# Patient Record
Sex: MALE | Race: WHITE | NOT HISPANIC OR LATINO | Employment: FULL TIME | URBAN - METROPOLITAN AREA
[De-identification: names, ages, dates, MRNs, and addresses within clinical notes are randomized per-mention and may not be internally consistent; named-entity substitution may affect disease eponyms.]

---

## 2024-02-03 ENCOUNTER — APPOINTMENT (OUTPATIENT)
Dept: CARDIOLOGY | Facility: HOSPITAL | Age: 69
DRG: 322 | End: 2024-02-03
Payer: MEDICARE

## 2024-02-03 ENCOUNTER — APPOINTMENT (OUTPATIENT)
Dept: RADIOLOGY | Facility: HOSPITAL | Age: 69
DRG: 322 | End: 2024-02-03
Payer: MEDICARE

## 2024-02-03 ENCOUNTER — HOSPITAL ENCOUNTER (INPATIENT)
Facility: HOSPITAL | Age: 69
LOS: 1 days | Discharge: HOME | DRG: 322 | End: 2024-02-06
Attending: STUDENT IN AN ORGANIZED HEALTH CARE EDUCATION/TRAINING PROGRAM | Admitting: INTERNAL MEDICINE
Payer: MEDICARE

## 2024-02-03 DIAGNOSIS — R93.1 ABNORMAL FINDINGS ON DIAGNOSTIC IMAGING OF HEART AND CORONARY CIRCULATION: ICD-10-CM

## 2024-02-03 DIAGNOSIS — R06.02 SHORTNESS OF BREATH: ICD-10-CM

## 2024-02-03 DIAGNOSIS — R07.9 CHEST PAIN, UNSPECIFIED TYPE: Primary | ICD-10-CM

## 2024-02-03 DIAGNOSIS — R07.9 CHEST PAIN AT REST: ICD-10-CM

## 2024-02-03 DIAGNOSIS — I20.2 REFRACTORY ANGINA PECTORIS (CMS-HCC): ICD-10-CM

## 2024-02-03 DIAGNOSIS — I15.9 SECONDARY HYPERTENSION: ICD-10-CM

## 2024-02-03 DIAGNOSIS — R07.89 OTHER CHEST PAIN: ICD-10-CM

## 2024-02-03 LAB
ALBUMIN SERPL BCP-MCNC: 3.8 G/DL (ref 3.4–5)
ALP SERPL-CCNC: 55 U/L (ref 33–136)
ALT SERPL W P-5'-P-CCNC: 25 U/L (ref 10–52)
ANION GAP SERPL CALC-SCNC: 11 MMOL/L (ref 10–20)
AST SERPL W P-5'-P-CCNC: 14 U/L (ref 9–39)
BASOPHILS # BLD AUTO: 0.03 X10*3/UL (ref 0–0.1)
BASOPHILS NFR BLD AUTO: 0.4 %
BILIRUB SERPL-MCNC: 0.6 MG/DL (ref 0–1.2)
BNP SERPL-MCNC: 21 PG/ML (ref 0–99)
BUN SERPL-MCNC: 14 MG/DL (ref 6–23)
CALCIUM SERPL-MCNC: 8.4 MG/DL (ref 8.6–10.3)
CARDIAC TROPONIN I PNL SERPL HS: 5 NG/L (ref 0–20)
CARDIAC TROPONIN I PNL SERPL HS: 5 NG/L (ref 0–20)
CHLORIDE SERPL-SCNC: 104 MMOL/L (ref 98–107)
CO2 SERPL-SCNC: 23 MMOL/L (ref 21–32)
CREAT SERPL-MCNC: 0.97 MG/DL (ref 0.5–1.3)
EGFRCR SERPLBLD CKD-EPI 2021: 86 ML/MIN/1.73M*2
EOSINOPHIL # BLD AUTO: 0.17 X10*3/UL (ref 0–0.7)
EOSINOPHIL NFR BLD AUTO: 2.5 %
ERYTHROCYTE [DISTWIDTH] IN BLOOD BY AUTOMATED COUNT: 13.1 % (ref 11.5–14.5)
FLUAV RNA RESP QL NAA+PROBE: NOT DETECTED
FLUBV RNA RESP QL NAA+PROBE: NOT DETECTED
GLUCOSE SERPL-MCNC: 129 MG/DL (ref 74–99)
HCT VFR BLD AUTO: 45.6 % (ref 41–52)
HGB BLD-MCNC: 15.9 G/DL (ref 13.5–17.5)
IMM GRANULOCYTES # BLD AUTO: 0.01 X10*3/UL (ref 0–0.7)
IMM GRANULOCYTES NFR BLD AUTO: 0.1 % (ref 0–0.9)
LYMPHOCYTES # BLD AUTO: 0.9 X10*3/UL (ref 1.2–4.8)
LYMPHOCYTES NFR BLD AUTO: 13.1 %
MAGNESIUM SERPL-MCNC: 1.58 MG/DL (ref 1.6–2.4)
MCH RBC QN AUTO: 30.6 PG (ref 26–34)
MCHC RBC AUTO-ENTMCNC: 34.9 G/DL (ref 32–36)
MCV RBC AUTO: 88 FL (ref 80–100)
MONOCYTES # BLD AUTO: 0.64 X10*3/UL (ref 0.1–1)
MONOCYTES NFR BLD AUTO: 9.3 %
NEUTROPHILS # BLD AUTO: 5.14 X10*3/UL (ref 1.2–7.7)
NEUTROPHILS NFR BLD AUTO: 74.6 %
NRBC BLD-RTO: 0 /100 WBCS (ref 0–0)
PLATELET # BLD AUTO: 207 X10*3/UL (ref 150–450)
POTASSIUM SERPL-SCNC: 3.7 MMOL/L (ref 3.5–5.3)
PROT SERPL-MCNC: 6.7 G/DL (ref 6.4–8.2)
RBC # BLD AUTO: 5.19 X10*6/UL (ref 4.5–5.9)
SARS-COV-2 RNA RESP QL NAA+PROBE: NOT DETECTED
SODIUM SERPL-SCNC: 134 MMOL/L (ref 136–145)
WBC # BLD AUTO: 6.9 X10*3/UL (ref 4.4–11.3)

## 2024-02-03 PROCEDURE — G0378 HOSPITAL OBSERVATION PER HR: HCPCS

## 2024-02-03 PROCEDURE — 93005 ELECTROCARDIOGRAM TRACING: CPT

## 2024-02-03 PROCEDURE — 36415 COLL VENOUS BLD VENIPUNCTURE: CPT | Performed by: STUDENT IN AN ORGANIZED HEALTH CARE EDUCATION/TRAINING PROGRAM

## 2024-02-03 PROCEDURE — 83880 ASSAY OF NATRIURETIC PEPTIDE: CPT | Performed by: STUDENT IN AN ORGANIZED HEALTH CARE EDUCATION/TRAINING PROGRAM

## 2024-02-03 PROCEDURE — 2500000001 HC RX 250 WO HCPCS SELF ADMINISTERED DRUGS (ALT 637 FOR MEDICARE OP): Performed by: STUDENT IN AN ORGANIZED HEALTH CARE EDUCATION/TRAINING PROGRAM

## 2024-02-03 PROCEDURE — 99285 EMERGENCY DEPT VISIT HI MDM: CPT | Mod: 25 | Performed by: STUDENT IN AN ORGANIZED HEALTH CARE EDUCATION/TRAINING PROGRAM

## 2024-02-03 PROCEDURE — 2550000001 HC RX 255 CONTRASTS: Performed by: STUDENT IN AN ORGANIZED HEALTH CARE EDUCATION/TRAINING PROGRAM

## 2024-02-03 PROCEDURE — 96372 THER/PROPH/DIAG INJ SC/IM: CPT

## 2024-02-03 PROCEDURE — 83735 ASSAY OF MAGNESIUM: CPT | Performed by: STUDENT IN AN ORGANIZED HEALTH CARE EDUCATION/TRAINING PROGRAM

## 2024-02-03 PROCEDURE — 80053 COMPREHEN METABOLIC PANEL: CPT | Performed by: STUDENT IN AN ORGANIZED HEALTH CARE EDUCATION/TRAINING PROGRAM

## 2024-02-03 PROCEDURE — 87636 SARSCOV2 & INF A&B AMP PRB: CPT | Performed by: STUDENT IN AN ORGANIZED HEALTH CARE EDUCATION/TRAINING PROGRAM

## 2024-02-03 PROCEDURE — 84484 ASSAY OF TROPONIN QUANT: CPT | Performed by: STUDENT IN AN ORGANIZED HEALTH CARE EDUCATION/TRAINING PROGRAM

## 2024-02-03 PROCEDURE — 85025 COMPLETE CBC W/AUTO DIFF WBC: CPT | Performed by: STUDENT IN AN ORGANIZED HEALTH CARE EDUCATION/TRAINING PROGRAM

## 2024-02-03 PROCEDURE — 71045 X-RAY EXAM CHEST 1 VIEW: CPT | Performed by: RADIOLOGY

## 2024-02-03 PROCEDURE — 70450 CT HEAD/BRAIN W/O DYE: CPT | Performed by: RADIOLOGY

## 2024-02-03 PROCEDURE — 71045 X-RAY EXAM CHEST 1 VIEW: CPT

## 2024-02-03 PROCEDURE — 2500000004 HC RX 250 GENERAL PHARMACY W/ HCPCS (ALT 636 FOR OP/ED): Performed by: STUDENT IN AN ORGANIZED HEALTH CARE EDUCATION/TRAINING PROGRAM

## 2024-02-03 PROCEDURE — 71275 CT ANGIOGRAPHY CHEST: CPT

## 2024-02-03 PROCEDURE — 99223 1ST HOSP IP/OBS HIGH 75: CPT | Performed by: STUDENT IN AN ORGANIZED HEALTH CARE EDUCATION/TRAINING PROGRAM

## 2024-02-03 PROCEDURE — 96365 THER/PROPH/DIAG IV INF INIT: CPT

## 2024-02-03 PROCEDURE — 70450 CT HEAD/BRAIN W/O DYE: CPT

## 2024-02-03 RX ORDER — ENOXAPARIN SODIUM 100 MG/ML
40 INJECTION SUBCUTANEOUS EVERY 24 HOURS
Status: DISCONTINUED | OUTPATIENT
Start: 2024-02-03 | End: 2024-02-06 | Stop reason: HOSPADM

## 2024-02-03 RX ORDER — ASPIRIN 81 MG/1
81 TABLET ORAL DAILY
COMMUNITY

## 2024-02-03 RX ORDER — CARVEDILOL 3.12 MG/1
6.25 TABLET ORAL 2 TIMES DAILY
Status: DISCONTINUED | OUTPATIENT
Start: 2024-02-03 | End: 2024-02-05

## 2024-02-03 RX ORDER — MAGNESIUM SULFATE HEPTAHYDRATE 40 MG/ML
2 INJECTION, SOLUTION INTRAVENOUS ONCE
Status: COMPLETED | OUTPATIENT
Start: 2024-02-03 | End: 2024-02-03

## 2024-02-03 RX ORDER — HYDRALAZINE HYDROCHLORIDE 20 MG/ML
5 INJECTION INTRAMUSCULAR; INTRAVENOUS EVERY 6 HOURS PRN
Status: DISCONTINUED | OUTPATIENT
Start: 2024-02-03 | End: 2024-02-06 | Stop reason: HOSPADM

## 2024-02-03 RX ORDER — NITROGLYCERIN 0.4 MG/1
0.4 TABLET SUBLINGUAL EVERY 5 MIN PRN
Status: DISCONTINUED | OUTPATIENT
Start: 2024-02-03 | End: 2024-02-06 | Stop reason: HOSPADM

## 2024-02-03 RX ORDER — ACETAMINOPHEN 325 MG/1
650 TABLET ORAL EVERY 4 HOURS PRN
Status: DISCONTINUED | OUTPATIENT
Start: 2024-02-03 | End: 2024-02-06 | Stop reason: HOSPADM

## 2024-02-03 RX ORDER — AMLODIPINE BESYLATE 5 MG/1
5 TABLET ORAL DAILY
Status: DISCONTINUED | OUTPATIENT
Start: 2024-02-03 | End: 2024-02-06 | Stop reason: HOSPADM

## 2024-02-03 RX ORDER — METOCLOPRAMIDE HYDROCHLORIDE 5 MG/ML
10 INJECTION INTRAMUSCULAR; INTRAVENOUS ONCE
Status: DISCONTINUED | OUTPATIENT
Start: 2024-02-03 | End: 2024-02-03

## 2024-02-03 RX ORDER — NITROGLYCERIN 0.4 MG/1
0.4 TABLET SUBLINGUAL ONCE
Status: COMPLETED | OUTPATIENT
Start: 2024-02-03 | End: 2024-02-03

## 2024-02-03 RX ORDER — NAPROXEN SODIUM 220 MG/1
81 TABLET, FILM COATED ORAL DAILY
Status: DISCONTINUED | OUTPATIENT
Start: 2024-02-04 | End: 2024-02-06 | Stop reason: HOSPADM

## 2024-02-03 RX ORDER — ONDANSETRON HYDROCHLORIDE 2 MG/ML
4 INJECTION, SOLUTION INTRAVENOUS EVERY 6 HOURS PRN
Status: DISCONTINUED | OUTPATIENT
Start: 2024-02-03 | End: 2024-02-06 | Stop reason: HOSPADM

## 2024-02-03 RX ORDER — KETOROLAC TROMETHAMINE 30 MG/ML
15 INJECTION, SOLUTION INTRAMUSCULAR; INTRAVENOUS ONCE
Status: DISCONTINUED | OUTPATIENT
Start: 2024-02-03 | End: 2024-02-03

## 2024-02-03 RX ORDER — NAPROXEN SODIUM 220 MG/1
324 TABLET, FILM COATED ORAL ONCE
Status: COMPLETED | OUTPATIENT
Start: 2024-02-03 | End: 2024-02-03

## 2024-02-03 RX ADMIN — NITROGLYCERIN 0.4 MG: 0.4 TABLET SUBLINGUAL at 17:55

## 2024-02-03 RX ADMIN — IOHEXOL 135 ML: 350 INJECTION, SOLUTION INTRAVENOUS at 15:26

## 2024-02-03 RX ADMIN — CARVEDILOL 6.25 MG: 3.12 TABLET, FILM COATED ORAL at 22:02

## 2024-02-03 RX ADMIN — ENOXAPARIN SODIUM 40 MG: 40 INJECTION SUBCUTANEOUS at 19:42

## 2024-02-03 RX ADMIN — AMLODIPINE BESYLATE 5 MG: 5 TABLET ORAL at 22:02

## 2024-02-03 RX ADMIN — MAGNESIUM SULFATE HEPTAHYDRATE 2 G: 40 INJECTION, SOLUTION INTRAVENOUS at 19:36

## 2024-02-03 RX ADMIN — ASPIRIN 81 MG CHEWABLE TABLET 324 MG: 81 TABLET CHEWABLE at 17:54

## 2024-02-03 SDOH — SOCIAL STABILITY: SOCIAL INSECURITY: ARE THERE ANY APPARENT SIGNS OF INJURIES/BEHAVIORS THAT COULD BE RELATED TO ABUSE/NEGLECT?: NO

## 2024-02-03 SDOH — SOCIAL STABILITY: SOCIAL INSECURITY: DO YOU FEEL ANYONE HAS EXPLOITED OR TAKEN ADVANTAGE OF YOU FINANCIALLY OR OF YOUR PERSONAL PROPERTY?: NO

## 2024-02-03 SDOH — SOCIAL STABILITY: SOCIAL INSECURITY: WERE YOU ABLE TO COMPLETE ALL THE BEHAVIORAL HEALTH SCREENINGS?: YES

## 2024-02-03 SDOH — SOCIAL STABILITY: SOCIAL INSECURITY: HAVE YOU HAD THOUGHTS OF HARMING ANYONE ELSE?: NO

## 2024-02-03 SDOH — SOCIAL STABILITY: SOCIAL INSECURITY: ARE YOU OR HAVE YOU BEEN THREATENED OR ABUSED PHYSICALLY, EMOTIONALLY, OR SEXUALLY BY ANYONE?: NO

## 2024-02-03 SDOH — SOCIAL STABILITY: SOCIAL INSECURITY: DOES ANYONE TRY TO KEEP YOU FROM HAVING/CONTACTING OTHER FRIENDS OR DOING THINGS OUTSIDE YOUR HOME?: NO

## 2024-02-03 SDOH — SOCIAL STABILITY: SOCIAL INSECURITY: DO YOU FEEL UNSAFE GOING BACK TO THE PLACE WHERE YOU ARE LIVING?: NO

## 2024-02-03 SDOH — SOCIAL STABILITY: SOCIAL INSECURITY: ABUSE: ADULT

## 2024-02-03 SDOH — SOCIAL STABILITY: SOCIAL INSECURITY: HAS ANYONE EVER THREATENED TO HURT YOUR FAMILY OR YOUR PETS?: NO

## 2024-02-03 ASSESSMENT — LIFESTYLE VARIABLES
AUDIT-C TOTAL SCORE: 0
HOW OFTEN DO YOU HAVE A DRINK CONTAINING ALCOHOL: NEVER
HOW MANY STANDARD DRINKS CONTAINING ALCOHOL DO YOU HAVE ON A TYPICAL DAY: PATIENT DOES NOT DRINK
EVER HAD A DRINK FIRST THING IN THE MORNING TO STEADY YOUR NERVES TO GET RID OF A HANGOVER: NO
HAVE YOU EVER FELT YOU SHOULD CUT DOWN ON YOUR DRINKING: NO
EVER FELT BAD OR GUILTY ABOUT YOUR DRINKING: NO
AUDIT-C TOTAL SCORE: 0
HAVE PEOPLE ANNOYED YOU BY CRITICIZING YOUR DRINKING: NO
HOW OFTEN DO YOU HAVE 6 OR MORE DRINKS ON ONE OCCASION: NEVER
SKIP TO QUESTIONS 9-10: 1

## 2024-02-03 ASSESSMENT — COGNITIVE AND FUNCTIONAL STATUS - GENERAL
PATIENT BASELINE BEDBOUND: NO
DAILY ACTIVITIY SCORE: 24
MOBILITY SCORE: 24

## 2024-02-03 ASSESSMENT — PATIENT HEALTH QUESTIONNAIRE - PHQ9
1. LITTLE INTEREST OR PLEASURE IN DOING THINGS: NOT AT ALL
2. FEELING DOWN, DEPRESSED OR HOPELESS: NOT AT ALL
SUM OF ALL RESPONSES TO PHQ9 QUESTIONS 1 & 2: 0

## 2024-02-03 ASSESSMENT — ACTIVITIES OF DAILY LIVING (ADL)
GROOMING: INDEPENDENT
ADEQUATE_TO_COMPLETE_ADL: YES
TOILETING: INDEPENDENT
DRESSING YOURSELF: INDEPENDENT
BATHING: INDEPENDENT
WALKS IN HOME: INDEPENDENT
PATIENT'S MEMORY ADEQUATE TO SAFELY COMPLETE DAILY ACTIVITIES?: YES
LACK_OF_TRANSPORTATION: NO
HEARING - LEFT EAR: FUNCTIONAL
JUDGMENT_ADEQUATE_SAFELY_COMPLETE_DAILY_ACTIVITIES: YES
FEEDING YOURSELF: INDEPENDENT
HEARING - RIGHT EAR: FUNCTIONAL

## 2024-02-03 ASSESSMENT — COLUMBIA-SUICIDE SEVERITY RATING SCALE - C-SSRS
6. HAVE YOU EVER DONE ANYTHING, STARTED TO DO ANYTHING, OR PREPARED TO DO ANYTHING TO END YOUR LIFE?: NO
1. IN THE PAST MONTH, HAVE YOU WISHED YOU WERE DEAD OR WISHED YOU COULD GO TO SLEEP AND NOT WAKE UP?: NO
2. HAVE YOU ACTUALLY HAD ANY THOUGHTS OF KILLING YOURSELF?: NO

## 2024-02-03 ASSESSMENT — PAIN SCALES - GENERAL
PAINLEVEL_OUTOF10: 5 - MODERATE PAIN
PAINLEVEL_OUTOF10: 7

## 2024-02-03 ASSESSMENT — PAIN - FUNCTIONAL ASSESSMENT
PAIN_FUNCTIONAL_ASSESSMENT: 0-10
PAIN_FUNCTIONAL_ASSESSMENT: 0-10

## 2024-02-03 NOTE — Clinical Note
Angioplasty of the left anterior descending lesion. Inflation 1: Pressure = 22 jeny; Duration = 20 sec. Inflation 2: Pressure = 16 jeny; Duration = 15 sec. Inflation 3: Pressure = 18 jeny; Duration = 15 sec.

## 2024-02-03 NOTE — Clinical Note
Angioplasty of the left anterior descending lesion. Inflation 1: Pressure = 6 jeny; Duration = 30 sec. Inflation 2: Pressure = 12 jeny; Duration = 15 sec. Inflation 3: Pressure = 6 jeny; Duration = 30 sec.

## 2024-02-04 LAB
ANION GAP SERPL CALC-SCNC: 12 MMOL/L (ref 10–20)
BUN SERPL-MCNC: 15 MG/DL (ref 6–23)
CALCIUM SERPL-MCNC: 8.8 MG/DL (ref 8.6–10.3)
CARDIAC TROPONIN I PNL SERPL HS: 4 NG/L (ref 0–20)
CHLORIDE SERPL-SCNC: 106 MMOL/L (ref 98–107)
CHOLEST SERPL-MCNC: 189 MG/DL (ref 0–199)
CHOLESTEROL/HDL RATIO: 4.4
CO2 SERPL-SCNC: 22 MMOL/L (ref 21–32)
CREAT SERPL-MCNC: 0.92 MG/DL (ref 0.5–1.3)
EGFRCR SERPLBLD CKD-EPI 2021: >90 ML/MIN/1.73M*2
ERYTHROCYTE [DISTWIDTH] IN BLOOD BY AUTOMATED COUNT: 13.2 % (ref 11.5–14.5)
EST. AVERAGE GLUCOSE BLD GHB EST-MCNC: 126 MG/DL
GLUCOSE SERPL-MCNC: 109 MG/DL (ref 74–99)
HBA1C MFR BLD: 6 %
HCT VFR BLD AUTO: 47.1 % (ref 41–52)
HDLC SERPL-MCNC: 42.7 MG/DL
HGB BLD-MCNC: 15.8 G/DL (ref 13.5–17.5)
LDLC SERPL CALC-MCNC: 127 MG/DL
MAGNESIUM SERPL-MCNC: 2.1 MG/DL (ref 1.6–2.4)
MCH RBC QN AUTO: 29.8 PG (ref 26–34)
MCHC RBC AUTO-ENTMCNC: 33.5 G/DL (ref 32–36)
MCV RBC AUTO: 89 FL (ref 80–100)
NON HDL CHOLESTEROL: 146 MG/DL (ref 0–149)
NRBC BLD-RTO: 0 /100 WBCS (ref 0–0)
PLATELET # BLD AUTO: 230 X10*3/UL (ref 150–450)
POTASSIUM SERPL-SCNC: 4.2 MMOL/L (ref 3.5–5.3)
RBC # BLD AUTO: 5.3 X10*6/UL (ref 4.5–5.9)
SODIUM SERPL-SCNC: 136 MMOL/L (ref 136–145)
TRIGL SERPL-MCNC: 96 MG/DL (ref 0–149)
VLDL: 19 MG/DL (ref 0–40)
WBC # BLD AUTO: 4.6 X10*3/UL (ref 4.4–11.3)

## 2024-02-04 PROCEDURE — 2500000001 HC RX 250 WO HCPCS SELF ADMINISTERED DRUGS (ALT 637 FOR MEDICARE OP): Performed by: STUDENT IN AN ORGANIZED HEALTH CARE EDUCATION/TRAINING PROGRAM

## 2024-02-04 PROCEDURE — G0378 HOSPITAL OBSERVATION PER HR: HCPCS

## 2024-02-04 PROCEDURE — 85027 COMPLETE CBC AUTOMATED: CPT | Performed by: STUDENT IN AN ORGANIZED HEALTH CARE EDUCATION/TRAINING PROGRAM

## 2024-02-04 PROCEDURE — 99223 1ST HOSP IP/OBS HIGH 75: CPT | Performed by: STUDENT IN AN ORGANIZED HEALTH CARE EDUCATION/TRAINING PROGRAM

## 2024-02-04 PROCEDURE — 80061 LIPID PANEL: CPT | Performed by: STUDENT IN AN ORGANIZED HEALTH CARE EDUCATION/TRAINING PROGRAM

## 2024-02-04 PROCEDURE — 36415 COLL VENOUS BLD VENIPUNCTURE: CPT | Performed by: STUDENT IN AN ORGANIZED HEALTH CARE EDUCATION/TRAINING PROGRAM

## 2024-02-04 PROCEDURE — 80048 BASIC METABOLIC PNL TOTAL CA: CPT | Performed by: STUDENT IN AN ORGANIZED HEALTH CARE EDUCATION/TRAINING PROGRAM

## 2024-02-04 PROCEDURE — 2500000005 HC RX 250 GENERAL PHARMACY W/O HCPCS: Performed by: STUDENT IN AN ORGANIZED HEALTH CARE EDUCATION/TRAINING PROGRAM

## 2024-02-04 PROCEDURE — 84484 ASSAY OF TROPONIN QUANT: CPT | Performed by: STUDENT IN AN ORGANIZED HEALTH CARE EDUCATION/TRAINING PROGRAM

## 2024-02-04 PROCEDURE — 2500000004 HC RX 250 GENERAL PHARMACY W/ HCPCS (ALT 636 FOR OP/ED): Performed by: STUDENT IN AN ORGANIZED HEALTH CARE EDUCATION/TRAINING PROGRAM

## 2024-02-04 PROCEDURE — 83735 ASSAY OF MAGNESIUM: CPT | Performed by: STUDENT IN AN ORGANIZED HEALTH CARE EDUCATION/TRAINING PROGRAM

## 2024-02-04 PROCEDURE — 83036 HEMOGLOBIN GLYCOSYLATED A1C: CPT | Mod: PARLAB | Performed by: STUDENT IN AN ORGANIZED HEALTH CARE EDUCATION/TRAINING PROGRAM

## 2024-02-04 PROCEDURE — 99232 SBSQ HOSP IP/OBS MODERATE 35: CPT | Performed by: INTERNAL MEDICINE

## 2024-02-04 RX ORDER — ATORVASTATIN CALCIUM 80 MG/1
80 TABLET, FILM COATED ORAL NIGHTLY
Status: DISCONTINUED | OUTPATIENT
Start: 2024-02-04 | End: 2024-02-06 | Stop reason: HOSPADM

## 2024-02-04 RX ADMIN — ATORVASTATIN CALCIUM 80 MG: 80 TABLET, FILM COATED ORAL at 20:41

## 2024-02-04 RX ADMIN — ENOXAPARIN SODIUM 40 MG: 40 INJECTION SUBCUTANEOUS at 20:41

## 2024-02-04 RX ADMIN — NITROGLYCERIN 0.4 MG: 0.4 TABLET SUBLINGUAL at 20:51

## 2024-02-04 RX ADMIN — CARVEDILOL 6.25 MG: 3.12 TABLET, FILM COATED ORAL at 10:02

## 2024-02-04 RX ADMIN — Medication 2 L/MIN: at 21:48

## 2024-02-04 RX ADMIN — ASPIRIN 81 MG 81 MG: 81 TABLET ORAL at 10:02

## 2024-02-04 RX ADMIN — AMLODIPINE BESYLATE 5 MG: 5 TABLET ORAL at 10:02

## 2024-02-04 RX ADMIN — CARVEDILOL 6.25 MG: 3.12 TABLET, FILM COATED ORAL at 20:41

## 2024-02-04 RX ADMIN — ACETAMINOPHEN 650 MG: 325 TABLET ORAL at 01:59

## 2024-02-04 RX ADMIN — HYDRALAZINE HYDROCHLORIDE 5 MG: 20 INJECTION INTRAMUSCULAR; INTRAVENOUS at 16:30

## 2024-02-04 ASSESSMENT — COGNITIVE AND FUNCTIONAL STATUS - GENERAL
DAILY ACTIVITIY SCORE: 24
MOBILITY SCORE: 24
MOBILITY SCORE: 24
DAILY ACTIVITIY SCORE: 24

## 2024-02-04 ASSESSMENT — PAIN DESCRIPTION - DESCRIPTORS
DESCRIPTORS: SQUEEZING
DESCRIPTORS: SQUEEZING

## 2024-02-04 ASSESSMENT — PAIN SCALES - GENERAL
PAINLEVEL_OUTOF10: 4
PAINLEVEL_OUTOF10: 3
PAINLEVEL_OUTOF10: 0 - NO PAIN
PAINLEVEL_OUTOF10: 4
PAINLEVEL_OUTOF10: 3

## 2024-02-04 ASSESSMENT — PAIN - FUNCTIONAL ASSESSMENT
PAIN_FUNCTIONAL_ASSESSMENT: 0-10

## 2024-02-04 NOTE — H&P
History Of Present Illness  67 yo M with PMHx of HTN (not currently on medications) presents with non exertional, intermittent, 6/10, L sided, squeezing chest pressure with radiation to his L shoulder and arm accompanied with mild sob over the past 3 days. He denies diaphoresis, palpitations, orthopnea, and LE swelling. Family gave him one SL nitrogylcerin on the way to the hospital which did alleviate some of his discomfort. A second dose was given in the ED with significant reduction in his CP. He was also noted to hypertensive on arrival to the ED with SBP in the 170s. At time of exam (after 2 nitroglycerin) .     Surgical History  denies     Social History  Denies tobacco, EtOH, and illicit    Family History  HTN    Allergies  Captopril    Review of Systems   Cardiovascular:  Positive for chest pain.   All other systems reviewed and are negative.    PE:  G: aox3, NAD, cooperative  HENT: neck supple, no JVD  Eyes: clear sclera  CV: RRR s1 s2  L: clear  Abd: soft, NT, non distended  Ext: no c/c/e  N: no appreciable acute focal deficits  Psych: appropriate mood and behavior     Last Recorded Vitals  /89 (BP Location: Left arm, Patient Position: Sitting)   Pulse 83   Temp 36.4 °C (97.5 °F) (Temporal)   Resp (!) 25   Wt 93 kg (205 lb)   SpO2 95%     Assessment/Plan     Atypical CP  HTN, uncontrolled  LVH  DVT ppx  Full code    Plan:  - Cycle troponin/EKG, ASA 81mg, PRN nitroSL, ECHO, cardiology consultation, NPO after midnight  - Likely longstanding uncontrolled HTN with LVH, has angioedema to ace-I, will also avoid ARB, will go ahead and start coreg and norvasc, BP lowering effect of norvasc may not be seen for a least 24 hours. Monitor BP trend and adjust BP meds accordingly.     Giacomo Smith, DO

## 2024-02-04 NOTE — CARE PLAN
The patient's goals for the shift include      The clinical goals for the shift include rest/comfort    Problem: Pain - Adult  Goal: Verbalizes/displays adequate comfort level or baseline comfort level  Outcome: Progressing     Problem: Safety - Adult  Goal: Free from fall injury  Outcome: Progressing     Problem: Discharge Planning  Goal: Discharge to home or other facility with appropriate resources  Outcome: Progressing     Problem: Chronic Conditions and Co-morbidities  Goal: Patient's chronic conditions and co-morbidity symptoms are monitored and maintained or improved  Outcome: Progressing     Problem: Pain  Goal: Takes deep breaths with improved pain control throughout the shift  Outcome: Progressing  Goal: Turns in bed with improved pain control throughout the shift  Outcome: Progressing  Goal: Walks with improved pain control throughout the shift  Outcome: Progressing  Goal: Performs ADL's with improved pain control throughout shift  Outcome: Progressing  Goal: Participates in PT with improved pain control throughout the shift  Outcome: Progressing  Goal: Free from opioid side effects throughout the shift  Outcome: Progressing  Goal: Free from acute confusion related to pain meds throughout the shift  Outcome: Progressing

## 2024-02-04 NOTE — CARE PLAN
The patient's goals for the shift include      The clinical goals for the shift include pt will report reduced / no pain    Over the shift, the patient did not make progress toward the following goals. Barriers to progression include reporting pain early. Recommendations to address these barriers include assess pain frequently.

## 2024-02-04 NOTE — PROGRESS NOTES
Pharmacy Medication History Review    Satya Camarillo is a 68 y.o. male admitted for No Principal Problem: There is no principal problem currently on the Problem List. Please update the Problem List and refresh.. Pharmacy reviewed the patient's [id number] medications and allergies for accuracy.    The list below reflectives the updated PTA list. Please review each medication in order reconciliation for additional clarification and justification.  Prior to Admission medications    Medication Sig Start Date End Date Taking? Authorizing Provider   aspirin 81 mg EC tablet Take 1 tablet (81 mg) by mouth once daily.   Yes Historical Provider, MD        The list below reflectives the updated allergy list. Please review each documented allergy for additional clarification and justification.  Allergies  Reviewed by Mary Steele CPhT on 2/3/2024        Severity Reactions Comments    Captopril Medium Angioedema             Below are additional concerns with the patient's PTA list.      Mary Steele CPhT

## 2024-02-04 NOTE — CONSULTS
Inpatient consult to Cardiology  Consult performed by: Augustine Valentine MD PhD  Consult ordered by: Giacomo Smith DO  Reason for consult: chest pain        History Of Present Illness:    67 yo M with PMHx of HTN (not currently on medications) presents with non exertional, intermittent, 6/10, L sided, squeezing chest pressure with radiation to his L shoulder and arm accompanied with mild sob over the past 3 days. He denies diaphoresis, palpitations, orthopnea, and LE swelling. Family gave him one SL nitrogylcerin on the way to the hospital which did alleviate some of his discomfort. A second dose was given in the ED with significant reduction in his CP. He was also noted to hypertensive on arrival to the ED with SBP in the 170s. At time of exam (after 2 nitroglycerin) .      Surgical History  denies     Social History  Denies tobacco, EtOH, and illicit     Family History  HTN     Allergies  Captopril     Review of Systems   Cardiovascular:  Positive for chest pain.   All other systems reviewed and are negative.     PE:  G: aox3, NAD, cooperative  HENT: neck supple, no JVD  Eyes: clear sclera  CV: RRR s1 s2  L: clear  Abd: soft, NT, non distended  Ext: no c/c/e  N: no appreciable acute focal deficits  Psych: appropriate mood and behavior         Last Recorded Vitals:  Vitals:    02/03/24 2005 02/04/24 0149 02/04/24 0624 02/04/24 1012   BP: (!) 166/98 145/84 161/89 (!) 156/92   BP Location: Right arm Left arm Left arm    Patient Position: Lying Lying Lying    Pulse: 72 68 66 60   Resp: 18 16 18    Temp: 36.7 °C (98.1 °F) 36.7 °C (98.1 °F) 36.3 °C (97.3 °F) 36 °C (96.8 °F)   TempSrc: Temporal Temporal Temporal    SpO2: 92% 91% 91% 94%   Weight:       Height:           Last Labs:  CBC - 2/4/2024:  8:07 AM  4.6 15.8 230    47.1      CMP - 2/4/2024:  8:07 AM  8.8 6.7 14 --- 0.6   _ 3.8 25 55      PTT - No results in last year.  _   _ _     Troponin I, High Sensitivity   Date/Time Value Ref Range Status  "  02/04/2024 08:07 AM 4 0 - 20 ng/L Final   02/03/2024 06:11 PM 5 0 - 20 ng/L Final   02/03/2024 03:31 PM 5 0 - 20 ng/L Final     BNP   Date/Time Value Ref Range Status   02/03/2024 03:31 PM 21 0 - 99 pg/mL Final     LDL Calculated   Date/Time Value Ref Range Status   02/04/2024 08:07  (H) <=99 mg/dL Final     Comment:                                 Near   Borderline      AGE      Desirable  Optimal    High     High     Very High     0-19 Y     0 - 109     ---    110-129   >/= 130     ----    20-24 Y     0 - 119     ---    120-159   >/= 160     ----      >24 Y     0 -  99   100-129  130-159   160-189     >/=190       VLDL   Date/Time Value Ref Range Status   02/04/2024 08:07 AM 19 0 - 40 mg/dL Final      Last I/O:  I/O last 3 completed shifts:  In: 170.8 (1.8 mL/kg) [P.O.:120; I.V.:50.8 (0.5 mL/kg)]  Out: - (0 mL/kg)   Weight: 93 kg     Past Cardiology Tests (Last 3 Years):  EKG:  No results found for this or any previous visit from the past 1095 days.    Echo:  No results found for this or any previous visit from the past 1095 days.    Ejection Fractions:  No results found for: \"EF\"  Cath:  No results found for this or any previous visit from the past 1095 days.    Stress Test:  No results found for this or any previous visit from the past 1095 days.    Cardiac Imaging:  No results found for this or any previous visit from the past 1095 days.      Past Medical History:  He has no past medical history on file.    Past Surgical History:  He has no past surgical history on file.      Social History:  He reports that he has never smoked. He has never used smokeless tobacco. He reports that he does not currently use alcohol. He reports that he does not use drugs.    Family History:  No family history on file.     Allergies:  Captopril    Inpatient Medications:  Scheduled medications   Medication Dose Route Frequency    amLODIPine  5 mg oral Daily    aspirin  81 mg oral Daily    atorvastatin  80 mg oral Nightly    " carvedilol  6.25 mg oral BID    enoxaparin  40 mg subcutaneous q24h    perflutren lipid microspheres  0.5-10 mL of dilution intravenous Once in imaging     PRN medications   Medication    acetaminophen    hydrALAZINE    nitroglycerin    ondansetron    oxygen     Continuous Medications   Medication Dose Last Rate     Outpatient Medications:  Current Outpatient Medications   Medication Instructions    aspirin 81 mg, oral, Daily       Physical Exam:  General: Awake, alert/oriented x3, well developed, no acute distress  Head: Atraumatic/Normocephalic  Eyes: Normal external exam, EOMI, PERRLA  ENT: Oropharynx normal, moist mucous membranes  Cardiovascular: RRR, S1/S2, no murmurs, rubs, or gallops, radial pulses +2, no edema of extremities  Pulmonary: CTAB, no respiratory distress. No wheezes, rales, or ronchi  Abdomen: +BS, soft, non-tender, nondistended, no guarding or rebound  MSK: No joint swelling, normal movements of all extremities. Range of motion- normal.  Extremities: no edema, no cyanosis  Neuro: Alert/oriented x3, no focal motor or sensory deficits  Psychiatric: Judgment intact. Appropriate mood and behavior     Assessment/Plan   Chest pain  Hypertension   Hyperlipidemia  Abnormal EKG      Had a long conversation with the patient and his wife at bedside.  He reports that he has been having episodes of chest pain on and off for the last 2 years that usually improves after rest.  His father had a stroke in his 70s and .  No other family history of premature coronary artery disease as far as he knows.  His EKG is abnormal with poor R progression and evidence for LVH.  Troponin repeats have been negative.  He has untreated hypercholesterolemia.  He also was found to be hypertensive on no medication.  Overall his chest pain is concerning to me and while it can be just due to high blood pressure, I believe it is reasonable to proceed with further evaluation with echocardiogram as well as a stress testing to rule  out any significant cardiac dysfunction and evidence of ischemia.  This test will be done tomorrow and based on that decision will be made if proceeding with heart catheterization will be needed.  For now I suggest to continue with aspirin, blood pressure control on current medications, I will add Lipitor and close monitoring and telemetry.  Patient and wife are in agreement with the plan.      I appreciate the opportunity to participate in this patient's care.      Augustine Valentine MD, PhD, [facility], [facility]  Interventional Cardiology, [facility]  Associate Professor of Medicine, [facility]  [email or web address]   Office:       Macro dragon disclaimer: This note was dictated by speech recognition. Minor errors in transcription may be present.

## 2024-02-04 NOTE — HOSPITAL COURSE
67 yo M with PMHx of HTN (not currently on medications) presents with non exertional, intermittent, 6/10, L sided, squeezing chest pressure with radiation to his L shoulder and arm accompanied with mild sob over the past 3 days. He denies diaphoresis, palpitations, orthopnea, and LE swelling. Family gave him one SL nitrogylcerin on the way to the hospital which did alleviate some of his discomfort. A second dose was given in the ED with significant reduction in his CP. He was also noted to hypertensive on arrival to the ED with SBP in the 170s. At time of exam (after 2 nitroglycerin) .

## 2024-02-04 NOTE — PROGRESS NOTES
Subjective:  Patient endorses is that he has been having chest pain for the past 2 years, however it became worse over the last few days.  States it is currently a 5/10 in severity.    Objective:  Visit Vitals  BP (!) 156/92   Pulse 60   Temp 36 °C (96.8 °F)   Resp 18      Physical Exam:   General: Alert, awake, cooperative.  HEENT:  Normocephalic, atraumatic, mucus membranes moist.  Neck:  Trachea midline.  Neck supple  Chest:  Clear to auscultation bilaterally. No wheezes, rales, or rhonchi.  CV:  Regular rate and rhythm.  Positive S1/S2.   GI: Bowel sounds present in all four quadrants, abdomen is soft, non-tender, non-distended.  Extremities:  No lower extremity edema or cyanosis.  Neurological:  AAOx3.  Moving all extremities  Skin:  Warm and dry.      Lab Results   Component Value Date    WBC 4.6 02/04/2024    HGB 15.8 02/04/2024    HCT 47.1 02/04/2024    MCV 89 02/04/2024     02/04/2024      Lab Results   Component Value Date    GLUCOSE 109 (H) 02/04/2024    CALCIUM 8.8 02/04/2024     02/04/2024    K 4.2 02/04/2024    CO2 22 02/04/2024     02/04/2024    BUN 15 02/04/2024    CREATININE 0.92 02/04/2024      Medications:  amLODIPine, 5 mg, oral, Daily  aspirin, 81 mg, oral, Daily  atorvastatin, 80 mg, oral, Nightly  carvedilol, 6.25 mg, oral, BID  enoxaparin, 40 mg, subcutaneous, q24h  perflutren lipid microspheres, 0.5-10 mL of dilution, intravenous, Once in imaging       Assessment/ Plan:  67 yo M with PMHx of HTN (not currently on medications) presents with non exertional, intermittent, 6/10, L sided, squeezing chest pressure with radiation to his L shoulder and arm accompanied with mild sob.    #Atypical chest pain  -Negative troponins.  Findings suggestive of LVH on EKG.  Difficult to assess coronary calcifications on CT due to significant motion artifact.  -Lipid panel reviewed.  Check A1c.  -Continue aspirin.  Atorvastatin added.  -Echo pending  -Cardiology following, awaiting echo and  recommending stress testing.     #Hypertension  -Continue Coreg and amlodipine.    #Hyperlipidemia  -High Intensity statin as above    Diet: Cardiac  DVT ppx: Lovenox  IVF: -  Consults: Cardiology  Dispo: Telemetry    Darrell Leger D.O.  Internal Medicine PGY-3    This is a preliminary note with physician attestation to follow.

## 2024-02-05 ENCOUNTER — APPOINTMENT (OUTPATIENT)
Dept: CARDIOLOGY | Facility: HOSPITAL | Age: 69
DRG: 322 | End: 2024-02-05
Payer: MEDICARE

## 2024-02-05 ENCOUNTER — APPOINTMENT (OUTPATIENT)
Dept: RADIOLOGY | Facility: HOSPITAL | Age: 69
DRG: 322 | End: 2024-02-05
Payer: MEDICARE

## 2024-02-05 PROBLEM — R07.9 CHEST PAIN AT REST: Status: ACTIVE | Noted: 2024-02-03

## 2024-02-05 PROBLEM — R07.9 CHEST PAIN, UNSPECIFIED TYPE: Status: ACTIVE | Noted: 2024-02-05

## 2024-02-05 LAB
ANION GAP SERPL CALC-SCNC: 12 MMOL/L (ref 10–20)
BUN SERPL-MCNC: 19 MG/DL (ref 6–23)
CALCIUM SERPL-MCNC: 9 MG/DL (ref 8.6–10.3)
CHLORIDE SERPL-SCNC: 107 MMOL/L (ref 98–107)
CO2 SERPL-SCNC: 23 MMOL/L (ref 21–32)
CREAT SERPL-MCNC: 1.06 MG/DL (ref 0.5–1.3)
EGFRCR SERPLBLD CKD-EPI 2021: 77 ML/MIN/1.73M*2
ERYTHROCYTE [DISTWIDTH] IN BLOOD BY AUTOMATED COUNT: 13.2 % (ref 11.5–14.5)
GLUCOSE SERPL-MCNC: 108 MG/DL (ref 74–99)
HCT VFR BLD AUTO: 50 % (ref 41–52)
HGB BLD-MCNC: 16.5 G/DL (ref 13.5–17.5)
MAGNESIUM SERPL-MCNC: 2.02 MG/DL (ref 1.6–2.4)
MCH RBC QN AUTO: 29.9 PG (ref 26–34)
MCHC RBC AUTO-ENTMCNC: 33 G/DL (ref 32–36)
MCV RBC AUTO: 91 FL (ref 80–100)
NRBC BLD-RTO: 0 /100 WBCS (ref 0–0)
PLATELET # BLD AUTO: 236 X10*3/UL (ref 150–450)
POTASSIUM SERPL-SCNC: 4.6 MMOL/L (ref 3.5–5.3)
RBC # BLD AUTO: 5.51 X10*6/UL (ref 4.5–5.9)
SODIUM SERPL-SCNC: 137 MMOL/L (ref 136–145)
WBC # BLD AUTO: 5.1 X10*3/UL (ref 4.4–11.3)

## 2024-02-05 PROCEDURE — 80048 BASIC METABOLIC PNL TOTAL CA: CPT | Performed by: STUDENT IN AN ORGANIZED HEALTH CARE EDUCATION/TRAINING PROGRAM

## 2024-02-05 PROCEDURE — 78452 HT MUSCLE IMAGE SPECT MULT: CPT

## 2024-02-05 PROCEDURE — 99221 1ST HOSP IP/OBS SF/LOW 40: CPT | Performed by: NURSE PRACTITIONER

## 2024-02-05 PROCEDURE — 2550000001 HC RX 255 CONTRASTS: Performed by: INTERNAL MEDICINE

## 2024-02-05 PROCEDURE — C1887 CATHETER, GUIDING: HCPCS | Performed by: INTERNAL MEDICINE

## 2024-02-05 PROCEDURE — 83735 ASSAY OF MAGNESIUM: CPT | Performed by: STUDENT IN AN ORGANIZED HEALTH CARE EDUCATION/TRAINING PROGRAM

## 2024-02-05 PROCEDURE — 93017 CV STRESS TEST TRACING ONLY: CPT

## 2024-02-05 PROCEDURE — 2500000004 HC RX 250 GENERAL PHARMACY W/ HCPCS (ALT 636 FOR OP/ED): Performed by: STUDENT IN AN ORGANIZED HEALTH CARE EDUCATION/TRAINING PROGRAM

## 2024-02-05 PROCEDURE — A9502 TC99M TETROFOSMIN: HCPCS | Performed by: STUDENT IN AN ORGANIZED HEALTH CARE EDUCATION/TRAINING PROGRAM

## 2024-02-05 PROCEDURE — 2500000001 HC RX 250 WO HCPCS SELF ADMINISTERED DRUGS (ALT 637 FOR MEDICARE OP): Performed by: NURSE PRACTITIONER

## 2024-02-05 PROCEDURE — 2060000001 HC INTERMEDIATE ICU ROOM DAILY

## 2024-02-05 PROCEDURE — C1874 STENT, COATED/COV W/DEL SYS: HCPCS | Performed by: INTERNAL MEDICINE

## 2024-02-05 PROCEDURE — 99152 MOD SED SAME PHYS/QHP 5/>YRS: CPT | Performed by: INTERNAL MEDICINE

## 2024-02-05 PROCEDURE — 2500000004 HC RX 250 GENERAL PHARMACY W/ HCPCS (ALT 636 FOR OP/ED): Performed by: NURSE PRACTITIONER

## 2024-02-05 PROCEDURE — 2720000007 HC OR 272 NO HCPCS: Performed by: INTERNAL MEDICINE

## 2024-02-05 PROCEDURE — 78452 HT MUSCLE IMAGE SPECT MULT: CPT | Performed by: INTERNAL MEDICINE

## 2024-02-05 PROCEDURE — C1894 INTRO/SHEATH, NON-LASER: HCPCS | Performed by: INTERNAL MEDICINE

## 2024-02-05 PROCEDURE — 93010 ELECTROCARDIOGRAM REPORT: CPT | Performed by: INTERNAL MEDICINE

## 2024-02-05 PROCEDURE — 2500000001 HC RX 250 WO HCPCS SELF ADMINISTERED DRUGS (ALT 637 FOR MEDICARE OP): Performed by: STUDENT IN AN ORGANIZED HEALTH CARE EDUCATION/TRAINING PROGRAM

## 2024-02-05 PROCEDURE — 99153 MOD SED SAME PHYS/QHP EA: CPT | Performed by: INTERNAL MEDICINE

## 2024-02-05 PROCEDURE — 93005 ELECTROCARDIOGRAM TRACING: CPT

## 2024-02-05 PROCEDURE — 99233 SBSQ HOSP IP/OBS HIGH 50: CPT | Performed by: INTERNAL MEDICINE

## 2024-02-05 PROCEDURE — 85027 COMPLETE CBC AUTOMATED: CPT | Performed by: STUDENT IN AN ORGANIZED HEALTH CARE EDUCATION/TRAINING PROGRAM

## 2024-02-05 PROCEDURE — 2500000001 HC RX 250 WO HCPCS SELF ADMINISTERED DRUGS (ALT 637 FOR MEDICARE OP): Performed by: INTERNAL MEDICINE

## 2024-02-05 PROCEDURE — C1725 CATH, TRANSLUMIN NON-LASER: HCPCS | Performed by: INTERNAL MEDICINE

## 2024-02-05 PROCEDURE — 7100000009 HC PHASE TWO TIME - INITIAL BASE CHARGE: Performed by: INTERNAL MEDICINE

## 2024-02-05 PROCEDURE — 92928 PRQ TCAT PLMT NTRAC ST 1 LES: CPT | Performed by: INTERNAL MEDICINE

## 2024-02-05 PROCEDURE — 4A023N7 MEASUREMENT OF CARDIAC SAMPLING AND PRESSURE, LEFT HEART, PERCUTANEOUS APPROACH: ICD-10-PCS | Performed by: INTERNAL MEDICINE

## 2024-02-05 PROCEDURE — 36415 COLL VENOUS BLD VENIPUNCTURE: CPT | Performed by: STUDENT IN AN ORGANIZED HEALTH CARE EDUCATION/TRAINING PROGRAM

## 2024-02-05 PROCEDURE — 2780000003 HC OR 278 NO HCPCS: Performed by: INTERNAL MEDICINE

## 2024-02-05 PROCEDURE — 027034Z DILATION OF CORONARY ARTERY, ONE ARTERY WITH DRUG-ELUTING INTRALUMINAL DEVICE, PERCUTANEOUS APPROACH: ICD-10-PCS | Performed by: INTERNAL MEDICINE

## 2024-02-05 PROCEDURE — 2500000005 HC RX 250 GENERAL PHARMACY W/O HCPCS: Performed by: INTERNAL MEDICINE

## 2024-02-05 PROCEDURE — 93458 L HRT ARTERY/VENTRICLE ANGIO: CPT | Performed by: INTERNAL MEDICINE

## 2024-02-05 PROCEDURE — B2151ZZ FLUOROSCOPY OF LEFT HEART USING LOW OSMOLAR CONTRAST: ICD-10-PCS | Performed by: INTERNAL MEDICINE

## 2024-02-05 PROCEDURE — 93458 L HRT ARTERY/VENTRICLE ANGIO: CPT | Mod: 59 | Performed by: INTERNAL MEDICINE

## 2024-02-05 PROCEDURE — 99232 SBSQ HOSP IP/OBS MODERATE 35: CPT | Performed by: INTERNAL MEDICINE

## 2024-02-05 PROCEDURE — 93016 CV STRESS TEST SUPVJ ONLY: CPT | Performed by: STUDENT IN AN ORGANIZED HEALTH CARE EDUCATION/TRAINING PROGRAM

## 2024-02-05 PROCEDURE — 7100000010 HC PHASE TWO TIME - EACH INCREMENTAL 1 MINUTE: Performed by: INTERNAL MEDICINE

## 2024-02-05 PROCEDURE — 2500000004 HC RX 250 GENERAL PHARMACY W/ HCPCS (ALT 636 FOR OP/ED): Performed by: INTERNAL MEDICINE

## 2024-02-05 PROCEDURE — 3430000001 HC RX 343 DIAGNOSTIC RADIOPHARMACEUTICALS: Performed by: STUDENT IN AN ORGANIZED HEALTH CARE EDUCATION/TRAINING PROGRAM

## 2024-02-05 PROCEDURE — C9600 PERC DRUG-EL COR STENT SING: HCPCS | Performed by: INTERNAL MEDICINE

## 2024-02-05 PROCEDURE — B2111ZZ FLUOROSCOPY OF MULTIPLE CORONARY ARTERIES USING LOW OSMOLAR CONTRAST: ICD-10-PCS | Performed by: INTERNAL MEDICINE

## 2024-02-05 PROCEDURE — C1769 GUIDE WIRE: HCPCS | Performed by: INTERNAL MEDICINE

## 2024-02-05 DEVICE — EVEROLIMUS-ELUTING PLATINUM CHROMIUM CORONARY STENT SYSTEM
Type: IMPLANTABLE DEVICE | Site: CORONARY | Status: FUNCTIONAL
Brand: SYNERGY™ XD

## 2024-02-05 RX ORDER — SODIUM CHLORIDE 9 MG/ML
1 INJECTION, SOLUTION INTRAVENOUS CONTINUOUS
Status: ACTIVE | OUTPATIENT
Start: 2024-02-05 | End: 2024-02-06

## 2024-02-05 RX ORDER — MIDAZOLAM HYDROCHLORIDE 1 MG/ML
INJECTION INTRAMUSCULAR; INTRAVENOUS AS NEEDED
Status: DISCONTINUED | OUTPATIENT
Start: 2024-02-05 | End: 2024-02-05 | Stop reason: HOSPADM

## 2024-02-05 RX ORDER — CLOPIDOGREL BISULFATE 75 MG/1
75 TABLET ORAL DAILY
Status: DISCONTINUED | OUTPATIENT
Start: 2024-02-06 | End: 2024-02-06 | Stop reason: HOSPADM

## 2024-02-05 RX ORDER — CLOPIDOGREL BISULFATE 300 MG/1
TABLET, FILM COATED ORAL AS NEEDED
Status: DISCONTINUED | OUTPATIENT
Start: 2024-02-05 | End: 2024-02-05 | Stop reason: HOSPADM

## 2024-02-05 RX ORDER — REGADENOSON 0.08 MG/ML
0.4 INJECTION, SOLUTION INTRAVENOUS
Status: COMPLETED | OUTPATIENT
Start: 2024-02-05 | End: 2024-02-05

## 2024-02-05 RX ORDER — VERAPAMIL HYDROCHLORIDE 2.5 MG/ML
INJECTION, SOLUTION INTRAVENOUS AS NEEDED
Status: DISCONTINUED | OUTPATIENT
Start: 2024-02-05 | End: 2024-02-05 | Stop reason: HOSPADM

## 2024-02-05 RX ORDER — FENTANYL CITRATE 50 UG/ML
INJECTION, SOLUTION INTRAMUSCULAR; INTRAVENOUS AS NEEDED
Status: DISCONTINUED | OUTPATIENT
Start: 2024-02-05 | End: 2024-02-05 | Stop reason: HOSPADM

## 2024-02-05 RX ORDER — HEPARIN SODIUM 1000 [USP'U]/ML
INJECTION, SOLUTION INTRAVENOUS; SUBCUTANEOUS AS NEEDED
Status: DISCONTINUED | OUTPATIENT
Start: 2024-02-05 | End: 2024-02-05 | Stop reason: HOSPADM

## 2024-02-05 RX ORDER — LIDOCAINE HYDROCHLORIDE 20 MG/ML
INJECTION, SOLUTION INFILTRATION; PERINEURAL AS NEEDED
Status: DISCONTINUED | OUTPATIENT
Start: 2024-02-05 | End: 2024-02-05 | Stop reason: HOSPADM

## 2024-02-05 RX ORDER — CARVEDILOL 12.5 MG/1
12.5 TABLET ORAL 2 TIMES DAILY
Status: DISCONTINUED | OUTPATIENT
Start: 2024-02-05 | End: 2024-02-06 | Stop reason: HOSPADM

## 2024-02-05 RX ORDER — NITROGLYCERIN 5 MG/ML
INJECTION, SOLUTION INTRAVENOUS AS NEEDED
Status: DISCONTINUED | OUTPATIENT
Start: 2024-02-05 | End: 2024-02-05 | Stop reason: HOSPADM

## 2024-02-05 RX ADMIN — ATORVASTATIN CALCIUM 80 MG: 80 TABLET, FILM COATED ORAL at 20:41

## 2024-02-05 RX ADMIN — REGADENOSON 0.4 MG: 0.08 INJECTION, SOLUTION INTRAVENOUS at 09:47

## 2024-02-05 RX ADMIN — AMLODIPINE BESYLATE 5 MG: 5 TABLET ORAL at 11:46

## 2024-02-05 RX ADMIN — TETROFOSMIN 11.7 MILLICURIE: 0.23 INJECTION, POWDER, LYOPHILIZED, FOR SOLUTION INTRAVENOUS at 07:20

## 2024-02-05 RX ADMIN — CARVEDILOL 12.5 MG: 12.5 TABLET, FILM COATED ORAL at 11:46

## 2024-02-05 RX ADMIN — SODIUM CHLORIDE 1 ML/KG/HR: 9 INJECTION, SOLUTION INTRAVENOUS at 20:42

## 2024-02-05 RX ADMIN — CARVEDILOL 12.5 MG: 12.5 TABLET, FILM COATED ORAL at 20:41

## 2024-02-05 RX ADMIN — TETROFOSMIN 33.8 MILLICURIE: 0.23 INJECTION, POWDER, LYOPHILIZED, FOR SOLUTION INTRAVENOUS at 10:00

## 2024-02-05 RX ADMIN — ASPIRIN 81 MG 81 MG: 81 TABLET ORAL at 11:47

## 2024-02-05 RX ADMIN — ENOXAPARIN SODIUM 40 MG: 40 INJECTION SUBCUTANEOUS at 20:41

## 2024-02-05 ASSESSMENT — PAIN SCALES - GENERAL
PAINLEVEL_OUTOF10: 0 - NO PAIN

## 2024-02-05 ASSESSMENT — COGNITIVE AND FUNCTIONAL STATUS - GENERAL
DAILY ACTIVITIY SCORE: 24
MOBILITY SCORE: 24

## 2024-02-05 ASSESSMENT — PAIN - FUNCTIONAL ASSESSMENT
PAIN_FUNCTIONAL_ASSESSMENT: 0-10

## 2024-02-05 NOTE — CARE PLAN
The patient's goals for the shift include      The clinical goals for the shift include pt will have no c/o chest pain    Over the shift, the patient did not make progress toward the following goals. Barriers to progression include acute illness. Recommendations to address these barriers include communication.

## 2024-02-05 NOTE — CARE PLAN
The patient's goals for the shift include      The clinical goals for the shift include pt will have no c/o chest pain    Over the shift, the patient did not make progress toward the following goals. Barriers to progression include pt had episode of chest pain. Recommendations to address these barriers include pt given nitroglycerin as ordered

## 2024-02-05 NOTE — PROGRESS NOTES
Subjective Data:  Admits to occasional chest discomfort this morning.  Denies any shortness of breath.    Overnight Events:    Noninvasive stress testing is performed.     Objective Data:  Last Recorded Vitals:  Vitals:    02/05/24 0207 02/05/24 0544 02/05/24 1127 02/05/24 1234   BP: (!) 170/96 (!) 164/93 (!) 189/98    BP Location: Left arm Left arm     Patient Position: Lying Lying Sitting    Pulse: 70 65 61    Resp: 16 14 19    Temp: 36.5 °C (97.7 °F) 36.2 °C (97.2 °F) 36 °C (96.8 °F)    TempSrc: Temporal      SpO2: 97% 94% 93% 94%   Weight:       Height:           Last Labs:  CBC - 2/5/2024:  6:45 AM  5.1 16.5 236    50.0      CMP - 2/5/2024:  6:45 AM  9.0 6.7 14 --- 0.6   _ 3.8 25 55      PTT - No results in last year.  _   _ _     TROPHS   Date/Time Value Ref Range Status   02/04/2024 08:07 AM 4 0 - 20 ng/L Final   02/03/2024 06:11 PM 5 0 - 20 ng/L Final   02/03/2024 03:31 PM 5 0 - 20 ng/L Final     BNP   Date/Time Value Ref Range Status   02/03/2024 03:31 PM 21 0 - 99 pg/mL Final     HGBA1C   Date/Time Value Ref Range Status   02/04/2024 08:07 AM 6.0 see below % Final     LDLCALC   Date/Time Value Ref Range Status   02/04/2024 08:07  <=99 mg/dL Final     Comment:                                 Near   Borderline      AGE      Desirable  Optimal    High     High     Very High     0-19 Y     0 - 109     ---    110-129   >/= 130     ----    20-24 Y     0 - 119     ---    120-159   >/= 160     ----      >24 Y     0 -  99   100-129  130-159   160-189     >/=190       VLDL   Date/Time Value Ref Range Status   02/04/2024 08:07 AM 19 0 - 40 mg/dL Final      Last I/O:  I/O last 3 completed shifts:  In: 170.8 (1.8 mL/kg) [P.O.:120; I.V.:50.8 (0.5 mL/kg)]  Out: - (0 mL/kg)   Weight: 93 kg     Past Cardiology Tests (Last 3 Years):  EKG:  No results found for this or any previous visit from the past 1095 days.    Echo:  No results found for this or any previous visit from the past 1095 days.    Ejection  "Fractions:  No results found for: \"EF\"  Cath:  No results found for this or any previous visit from the past 1095 days.    Stress Test:  No results found for this or any previous visit from the past 1095 days.    Cardiac Imaging:  No results found for this or any previous visit from the past 1095 days.      Inpatient Medications:  Scheduled medications   Medication Dose Route Frequency    amLODIPine  5 mg oral Daily    aspirin  81 mg oral Daily    atorvastatin  80 mg oral Nightly    carvedilol  12.5 mg oral BID    enoxaparin  40 mg subcutaneous q24h    perflutren lipid microspheres  0.5-10 mL of dilution intravenous Once in imaging     PRN medications   Medication    acetaminophen    hydrALAZINE    nitroglycerin    ondansetron    oxygen     Continuous Medications   Medication Dose Last Rate       Physical Exam:  Physical Exam  Vitals reviewed.   Constitutional:       Appearance: Normal appearance.   HENT:      Head: Normocephalic and atraumatic.      Mouth/Throat:      Mouth: Mucous membranes are moist.      Pharynx: Oropharynx is clear.   Eyes:      Extraocular Movements: Extraocular movements intact.      Conjunctiva/sclera: Conjunctivae normal.   Cardiovascular:      Rate and Rhythm: Normal rate and regular rhythm.      Pulses: Normal pulses.      Heart sounds: Normal heart sounds.   Pulmonary:      Effort: Pulmonary effort is normal.      Breath sounds: Normal breath sounds.   Abdominal:      General: Bowel sounds are normal.      Palpations: Abdomen is soft.   Musculoskeletal:      Cervical back: Neck supple.   Skin:     General: Skin is warm and dry.   Neurological:      General: No focal deficit present.      Mental Status: He is alert.   Psychiatric:         Mood and Affect: Mood normal.         Behavior: Behavior normal.           Assessment/Plan   2/5/2024:  1.  Chest pain: Still has intermittent discomfort.  Uncertain if this is related to hypertension versus coronary artery disease.  Will go ahead and " increase carvedilol to 12.5 mg twice daily.  Will continue amlodipine, aspirin and statin therapy.  Noninvasive stress testing is abnormal with an ejection fraction 53%, prior inferior myocardial infarction.  Will make n.p.o. for cardiac catheterization tomorrow.  2.  Hypertension: As above.    Peripheral IV 02/03/24 18 G Left Antecubital (Active)   Site Assessment Intact;Dry;Clean 02/05/24 0900   Dressing Status Clean;Dry 02/05/24 0900   Number of days: 2       Peripheral IV 02/03/24 18 G Right Forearm (Active)   Site Assessment Intact;Dry;Clean 02/05/24 0900   Dressing Status Clean;Dry 02/05/24 0900   Number of days: 2       Code Status:  Full Code    Saurabh Youngblood MD

## 2024-02-05 NOTE — H&P
History and Physical   Pre Surgical Review (< 30 days)      History & Physical Reviewed    I have reviewed the History and Physical dated:  2/3/24   History and Physical reviewed and relevant findings noted. Patient examined to review pertinent physical  findings.: No significant changes   Home Medications Reviewed: see EMR.   Allergies Reviewed: no changes noted      Home Medications  Current Outpatient Medications   Medication Instructions    aspirin 81 mg, oral, Daily        Allergies  Allergies   Allergen Reactions    Captopril Angioedema       Physical Exam  Physical Exam  Constitutional:       General: He is not in acute distress.  Cardiovascular:      Rate and Rhythm: Normal rate and regular rhythm.      Comments: + pulses all extremities.  Pulmonary:      Effort: Pulmonary effort is normal. No respiratory distress.      Comments: Clear bilaterally.  Abdominal:      General: Bowel sounds are normal.   Neurological:      General: No focal deficit present.      Mental Status: He is alert and oriented to person, place, and time.   Psychiatric:         Mood and Affect: Mood normal.         Behavior: Behavior normal.          Airway/Sedation Assessment     Assessment by anesthesia N/A     ·  Mouth Opening OK yes      Neck Flexibility OK yes      Loose Teeth No   ·  Oropharyngeal Classification Grade III   ·  ASA PS Classification ASA II - Patient with mild systemic disease      Sedation Plan Moderate     Risks, benefits, and alternatives discussed with patient.     ERAS (Enhanced Recovery After Surgery):  ·  ERAS Patient: No      Consent:   COVID-19 Consent:  ·  COVID-19 Risk Consent Surgeon has reviewed key risks related to the risk of yarelis COVID-19 and if they contract COVID-19 what the risks are.     Janette Mar, APRN-CNP

## 2024-02-05 NOTE — DISCHARGE INSTRUCTIONS
CARDIAC CATHETERIZATION DISCHARGE INSTRUCTIONS     FOR SUDDEN AND SEVERE CHEST PAIN, SHORTNESS OF BREATH, EXCESSIVE BLEEDING, SIGNS OF STROKE, OR CHANGES IN MENTAL STATUS YOU SHOULD CALL 911 IMMEDIATELY.     If your provider has prescribed aspirin and/or clopidogrel (Plavix), or prasugrel (Effient), or ticagrelor (Brilinta), DO NOT STOP THESE MEDICATIONS for any reason without talking to your cardiologist first. If any of these were prescribed, you must take them every day without missing a single dose. If you are getting low on these medications, contact your provider immediately for a refill.     FOR NEXT 24 HOURS  - Upon discharge, you should return home and rest for the remainder of the day and evening. You do not have to stay on bed rest but should not be very active.  It is recommended a responsible adult be with you for the first 24 hours after the procedure.    - No driving for 24 hours after procedure. Please arrange for someone to drive you home from the hospital today.     - Do not drive, operate machinery, or use power tools for 24 hours after your procedure.     - Do not make any legal decisions for 24 hours after your procedure.     - Do not drink alcoholic beverages for 24 hours after your procedure.    WOUND CARE   *FOR RADIAL (WRIST) ACCESS*  ·      No lifting anything with affected arm, no bending or flexing affected wrist for 24 hours - for example, treat your wrist as if it is sprained.        ·      No lifting greater that 5 pounds with your affected arm for 5 days.  ·      Do not engage in vigorous activities (tennis, golf, bowling, weights) for at least 5 days after the procedure.  ·      Do not submerge the wrist in water for 7 days after the procedure.  ·      You should expect mild tingling in your hand and tenderness at the puncture site for up to 3 days.    - The transparent dressing should be removed from the site 24 hours after the procedure.  Wash the site gently with soap and  water. Rinse well and pat dry. Keep the area clean and dry. You may apply a Band-Aid to the site. Avoid lotions, ointments, or powders until fully healed.     - You may shower the day after your procedure.      - It is normal to notice a small bruise around the puncture site and/or a small grape sized or smaller lump. Any large bruising or large lump warrants a call to the office.     - If bleeding should occur, lay down and apply pressure to the affected area for 10 minutes.  If the bleeding stops notify your physician.  If there is a large amount of bleeding or spurting of blood CALL 911 immediately.  DO NOT drive yourself to the hospital.    - You may experience some tenderness, bruising or minimal inflammation.  If you have any concerns, you may contact the Cath Lab or if any of these symptoms become excessive, contact your cardiologist or go to the emergency room.     OTHER INSTRUCTIONS  - You may take acetaminophen (Tylenol) as directed for discomfort.  If pain is not relieved with acetaminophen (Tylenol), contact your doctor.    - If you notice or experience any of the following, you should notify your doctor or seek medical attention  Chest pain or discomfort  Change in mental status or weakness in extremities.  Dizziness, light headedness, or feeling faint.  Change in the site where the procedure was performed, such as bleeding or an increased area of bruising or swelling.  Tingling, numbness, pain, or coolness in the leg/arm beyond the site where the procedure was performed.  Signs of infection (i.e. shaking chills, temperature > 100 degrees Fahrenheit, warmth, redness) in the leg/arm area where the procedure was performed.  Changes in urination   Bloody or black stools  Vomiting blood  Severe nose bleeds  Any excessive bleeding    - Please follow up with Dr. Youngblood 3/1/24 as scheduled or sooner if needed, [phone].

## 2024-02-05 NOTE — PROGRESS NOTES
Subjective:  No acute events.  Patient endorsing intermittent chest pain this morning.    Objective:  Visit Vitals  /85   Pulse 66   Temp 36.3 °C (97.3 °F) (Temporal)   Resp 16      Physical Exam:   General: Alert, awake, cooperative.  HEENT:  Normocephalic, atraumatic, mucus membranes moist.  Neck:  Trachea midline.  Neck supple  Chest:  Clear to auscultation bilaterally. No wheezes, rales, or rhonchi.  CV:  Regular rate and rhythm.  Positive S1/S2.   GI: Bowel sounds present in all four quadrants, abdomen is soft, non-tender, non-distended.  Extremities:  No lower extremity edema or cyanosis.  Neurological:  AAOx3.  Moving all extremities  Skin:  Warm and dry.    Lab Results   Component Value Date    WBC 5.1 02/05/2024    HGB 16.5 02/05/2024    HCT 50.0 02/05/2024    MCV 91 02/05/2024     02/05/2024      Lab Results   Component Value Date    GLUCOSE 108 (H) 02/05/2024    CALCIUM 9.0 02/05/2024     02/05/2024    K 4.6 02/05/2024    CO2 23 02/05/2024     02/05/2024    BUN 19 02/05/2024    CREATININE 1.06 02/05/2024      Medications:  amLODIPine, 5 mg, oral, Daily  aspirin, 81 mg, oral, Daily  atorvastatin, 80 mg, oral, Nightly  carvedilol, 12.5 mg, oral, BID  enoxaparin, 40 mg, subcutaneous, q24h  perflutren lipid microspheres, 0.5-10 mL of dilution, intravenous, Once in imaging       Assessment/ Plan:  69 yo M with PMHx of HTN (not currently on medications) presents with non exertional, intermittent, 6/10, L sided, squeezing chest pressure with radiation to his L shoulder and arm accompanied with mild sob.     #Atypical chest pain  -Negative troponins.  Findings suggestive of LVH on EKG.  Difficult to assess coronary calcifications on CT due to significant motion artifact.  -Lipid panel and A1c reviewed.   -Continue aspirin and atorvastatin  -Echo pending  -Cardiology following.  Patient's stress test was abnormal, plan for cardiac cath today and transfer to stepdown afterwards.      #Hypertension  -Continue Coreg and amlodipine.     #Hyperlipidemia  -High Intensity statin as above     Diet: Cardiac  DVT ppx: Lovenox  IVF: -  Consults: Cardiology  Dispo: Stepdown     Darrell Leger D.O.  Internal Medicine PGY-3

## 2024-02-05 NOTE — PROGRESS NOTES
TCC Note: Attempted to meet with pt to discuss Discharge Planning however, pt was off the floor in ECHO. Will attempt again when he returns to the floor. Brie Hubbard, MSN, RN, TCC.

## 2024-02-05 NOTE — ED PROVIDER NOTES
HPI   Chief Complaint   Patient presents with    Shortness of Breath    Chest Pain    Headache     Stated this morning, when driving. Pt states chest pain, SOB. Denied dizziness, blurred vision, but states that his vision today is worse than normally. No prior history of similar episodes. EKG done in triage and  handed to the provider.        This is a 68-year-old male with past medical history of hypertension presents to the emergency department for chest pain.  Patient is not primarily English speaking and translation performed by son at bedside.  Sylvie offered but declined.  Patient reportedly has been having chest pain on and off for the last 2 years.  States he has never gone to get it checked out at a physician.  He only intermittently takes his blood pressure medications and occasionally takes a baby aspirin during the day.  Today his symptoms seem to be worse.  He endorses chest pain that radiates to his back.  He also endorses a headache and mild blurred vision to both eyes.  He had some tingling and radiation of the pain to the left shoulder.  He otherwise denies fever/chills, headaches, cough, abdominal pain, urinary symptoms, lower extremity pain or swelling.      History provided by:  Patient and relative   used: No                        Que Coma Scale Score: 15                  Patient History   History reviewed. No pertinent past medical history.  History reviewed. No pertinent surgical history.  No family history on file.  Social History     Tobacco Use    Smoking status: Never    Smokeless tobacco: Never   Vaping Use    Vaping Use: Not on file   Substance Use Topics    Alcohol use: Not Currently    Drug use: Never       Physical Exam   ED Triage Vitals [02/03/24 1445]   Temp Heart Rate Resp BP   36.4 °C (97.5 °F) (!) 113 18 171/89      SpO2 Temp Source Heart Rate Source Patient Position   95 % Temporal Monitor Sitting      BP Location FiO2 (%)     Left arm --       Physical  Exam  GEN: well appearing, no acute distress  EYES: EOMI, PEERL, no scleral icterus  CVS/CHEST: reg rate, nl rhythm, no murmurs/gallops/rubs  PULM: CTAB b/l no wheezes, crackles, or rhonchi   GI: NT/ND, no masses or organomegaly, soft, no guarding  BACK: no CVA tenderness  EXT: no LE edema, 2+ periph pulses in bilat radial and DP   NEURO: CN 2-12 grossly intact, no focal deficits, no facial asymmetry, moving all extremities, 5/5 Strength in bicep/tricep/hip flexor/plantar flexion. Sensation over these muscle groups intact.   PSYCH: AAOx3 answers questions appropriately  ED Course & MDM   ED Course as of 02/05/24 0342   Sat Feb 03, 2024   1755 Patient stood up to attempt to ambulate to the bathroom and had recurrence of his chest pain as well as dizziness which again described as lightheadedness.  Repeat vitals showed hypertension.  Patient treated with nitroglycerin.  Repeat EKG shows sinus rhythm, left axis, intervals unremarkable, no significant ST elevations or depressions [DE]      ED Course User Index  [DE] Aaron Patrick MD         Diagnoses as of 02/05/24 0342   Chest pain, unspecified type   Shortness of breath       Medical Decision Making  This is a 68-year-old male with past medical history of hypertension presents to the emergency department for chest pain.  Patient stable upon presentation to the emergency department, no acute distress and vitals remarkable for hypertension as well as tachycardia.  He is otherwise satting well on room air.  On exam he does not appear to be in any respiratory distress.  Lungs are clear, abdomen is nontender.  He has intact and equal pulses globally.  With his chest pain rating to his back as well as headache/lightheadedness and reported blurry vision there is some concern for aortic pathology and CTA of the chest/abdomen/pelvis was ordered emergently.  ACS also considered.  Lower suspicion for infectious process as patient without any systemic signs of infection.  Patient  was given Toradol for his headache.  Aspirin initially held pending CTA.    EKG as interpreted by me: Sinus tachycardia 107 bpm, left axis, occasional PVC, nonspecific ST changes without significant ST elevations or depressions    Patient's lab work was largely unremarkable including negative troponins x 2.  His chest pain had improved in the emergency department.  CT angio with no acute cardiopulmonary or aortic process.  Patient's symptoms largely concerning for ACS and I do feel that he would benefit from admission and further workup.  Patient discussed with hospitalist on-call Dr. Miranda who will admit.    Procedure  Procedures     Aaron Patrick MD  02/05/24 0358

## 2024-02-06 ENCOUNTER — APPOINTMENT (OUTPATIENT)
Dept: CARDIOLOGY | Facility: HOSPITAL | Age: 69
DRG: 322 | End: 2024-02-06
Payer: MEDICARE

## 2024-02-06 VITALS
OXYGEN SATURATION: 90 % | DIASTOLIC BLOOD PRESSURE: 82 MMHG | SYSTOLIC BLOOD PRESSURE: 150 MMHG | HEART RATE: 65 BPM | BODY MASS INDEX: 29.35 KG/M2 | HEIGHT: 70 IN | RESPIRATION RATE: 20 BRPM | TEMPERATURE: 97.5 F | WEIGHT: 205.03 LBS

## 2024-02-06 PROBLEM — R07.9 CHEST PAIN AT REST: Status: RESOLVED | Noted: 2024-02-03 | Resolved: 2024-02-06

## 2024-02-06 PROBLEM — I10 HYPERTENSION: Status: ACTIVE | Noted: 2024-02-06

## 2024-02-06 PROBLEM — R07.89 ATYPICAL CHEST PAIN: Status: RESOLVED | Noted: 2024-02-03 | Resolved: 2024-02-06

## 2024-02-06 PROBLEM — R07.9 CHEST PAIN, UNSPECIFIED TYPE: Status: RESOLVED | Noted: 2024-02-05 | Resolved: 2024-02-06

## 2024-02-06 LAB
ANION GAP SERPL CALC-SCNC: 11 MMOL/L (ref 10–20)
AORTIC VALVE MEAN GRADIENT: 2 MMHG
AORTIC VALVE PEAK VELOCITY: 1.05 M/S
AV PEAK GRADIENT: 4.4 MMHG
AVA (PEAK VEL): 4.09 CM2
AVA (VTI): 4.28 CM2
BUN SERPL-MCNC: 17 MG/DL (ref 6–23)
CALCIUM SERPL-MCNC: 8.8 MG/DL (ref 8.6–10.3)
CHLORIDE SERPL-SCNC: 108 MMOL/L (ref 98–107)
CO2 SERPL-SCNC: 23 MMOL/L (ref 21–32)
CREAT SERPL-MCNC: 1.02 MG/DL (ref 0.5–1.3)
EGFRCR SERPLBLD CKD-EPI 2021: 81 ML/MIN/1.73M*2
EJECTION FRACTION APICAL 4 CHAMBER: 55.3
EJECTION FRACTION: 54 %
ERYTHROCYTE [DISTWIDTH] IN BLOOD BY AUTOMATED COUNT: 13.2 % (ref 11.5–14.5)
GLUCOSE SERPL-MCNC: 105 MG/DL (ref 74–99)
HCT VFR BLD AUTO: 44.8 % (ref 41–52)
HGB BLD-MCNC: 14.8 G/DL (ref 13.5–17.5)
LEFT ATRIUM VOLUME AREA LENGTH INDEX BSA: 19.6 ML/M2
LEFT VENTRICLE INTERNAL DIMENSION DIASTOLE: 4.63 CM (ref 3.5–6)
LEFT VENTRICULAR OUTFLOW TRACT DIAMETER: 2.5 CM
MAGNESIUM SERPL-MCNC: 1.88 MG/DL (ref 1.6–2.4)
MCH RBC QN AUTO: 29.7 PG (ref 26–34)
MCHC RBC AUTO-ENTMCNC: 33 G/DL (ref 32–36)
MCV RBC AUTO: 90 FL (ref 80–100)
MITRAL VALVE E/A RATIO: 0.8
NRBC BLD-RTO: 0 /100 WBCS (ref 0–0)
PLATELET # BLD AUTO: 212 X10*3/UL (ref 150–450)
POTASSIUM SERPL-SCNC: 4.3 MMOL/L (ref 3.5–5.3)
RBC # BLD AUTO: 4.99 X10*6/UL (ref 4.5–5.9)
RIGHT VENTRICLE FREE WALL PEAK S': 10.8 CM/S
SODIUM SERPL-SCNC: 138 MMOL/L (ref 136–145)
WBC # BLD AUTO: 5.8 X10*3/UL (ref 4.4–11.3)

## 2024-02-06 PROCEDURE — 93306 TTE W/DOPPLER COMPLETE: CPT | Performed by: INTERNAL MEDICINE

## 2024-02-06 PROCEDURE — 2500000001 HC RX 250 WO HCPCS SELF ADMINISTERED DRUGS (ALT 637 FOR MEDICARE OP): Performed by: NURSE PRACTITIONER

## 2024-02-06 PROCEDURE — 99239 HOSP IP/OBS DSCHRG MGMT >30: CPT | Performed by: STUDENT IN AN ORGANIZED HEALTH CARE EDUCATION/TRAINING PROGRAM

## 2024-02-06 PROCEDURE — 93306 TTE W/DOPPLER COMPLETE: CPT

## 2024-02-06 PROCEDURE — 80048 BASIC METABOLIC PNL TOTAL CA: CPT | Performed by: NURSE PRACTITIONER

## 2024-02-06 PROCEDURE — 2500000001 HC RX 250 WO HCPCS SELF ADMINISTERED DRUGS (ALT 637 FOR MEDICARE OP): Performed by: STUDENT IN AN ORGANIZED HEALTH CARE EDUCATION/TRAINING PROGRAM

## 2024-02-06 PROCEDURE — 83735 ASSAY OF MAGNESIUM: CPT | Performed by: NURSE PRACTITIONER

## 2024-02-06 PROCEDURE — 85027 COMPLETE CBC AUTOMATED: CPT | Performed by: NURSE PRACTITIONER

## 2024-02-06 PROCEDURE — 99232 SBSQ HOSP IP/OBS MODERATE 35: CPT | Performed by: INTERNAL MEDICINE

## 2024-02-06 PROCEDURE — 2500000004 HC RX 250 GENERAL PHARMACY W/ HCPCS (ALT 636 FOR OP/ED): Performed by: STUDENT IN AN ORGANIZED HEALTH CARE EDUCATION/TRAINING PROGRAM

## 2024-02-06 PROCEDURE — 36415 COLL VENOUS BLD VENIPUNCTURE: CPT | Performed by: NURSE PRACTITIONER

## 2024-02-06 RX ORDER — CARVEDILOL 12.5 MG/1
12.5 TABLET ORAL 2 TIMES DAILY
Qty: 60 TABLET | Refills: 0 | Status: SHIPPED | OUTPATIENT
Start: 2024-02-06 | End: 2024-03-15 | Stop reason: SDUPTHER

## 2024-02-06 RX ORDER — AMLODIPINE BESYLATE 5 MG/1
5 TABLET ORAL DAILY
Qty: 30 TABLET | Refills: 0 | Status: SHIPPED | OUTPATIENT
Start: 2024-02-07 | End: 2024-03-15 | Stop reason: SDUPTHER

## 2024-02-06 RX ORDER — CLOPIDOGREL BISULFATE 75 MG/1
75 TABLET ORAL DAILY
Qty: 30 TABLET | Refills: 11 | Status: SHIPPED | OUTPATIENT
Start: 2024-02-06 | End: 2024-03-01 | Stop reason: SINTOL

## 2024-02-06 RX ORDER — ATORVASTATIN CALCIUM 80 MG/1
80 TABLET, FILM COATED ORAL NIGHTLY
Qty: 30 TABLET | Refills: 11 | Status: SHIPPED | OUTPATIENT
Start: 2024-02-06 | End: 2025-02-05

## 2024-02-06 RX ORDER — MAGNESIUM SULFATE HEPTAHYDRATE 40 MG/ML
2 INJECTION, SOLUTION INTRAVENOUS ONCE
Status: COMPLETED | OUTPATIENT
Start: 2024-02-06 | End: 2024-02-06

## 2024-02-06 RX ADMIN — ASPIRIN 81 MG 81 MG: 81 TABLET ORAL at 08:50

## 2024-02-06 RX ADMIN — CLOPIDOGREL BISULFATE 75 MG: 75 TABLET ORAL at 08:50

## 2024-02-06 RX ADMIN — AMLODIPINE BESYLATE 5 MG: 5 TABLET ORAL at 08:50

## 2024-02-06 RX ADMIN — MAGNESIUM SULFATE HEPTAHYDRATE 2 G: 40 INJECTION, SOLUTION INTRAVENOUS at 08:26

## 2024-02-06 RX ADMIN — CARVEDILOL 12.5 MG: 12.5 TABLET, FILM COATED ORAL at 08:50

## 2024-02-06 ASSESSMENT — COGNITIVE AND FUNCTIONAL STATUS - GENERAL
DAILY ACTIVITIY SCORE: 24
MOBILITY SCORE: 24

## 2024-02-06 ASSESSMENT — PAIN SCALES - GENERAL: PAINLEVEL_OUTOF10: 0 - NO PAIN

## 2024-02-06 ASSESSMENT — PAIN - FUNCTIONAL ASSESSMENT: PAIN_FUNCTIONAL_ASSESSMENT: 0-10

## 2024-02-06 NOTE — PROGRESS NOTES
02/06/24 0954   Discharge Planning   Living Arrangements Spouse/significant other   Support Systems Spouse/significant other   Assistance Needed Independent, patient does drive   Home or Post Acute Services None   Patient expects to be discharged to: Home     Met with patient and patients wife at bedside, introduced self and role on care transitions team. Admission assessment completed with patient. Address and contact information verified. Insurance verified.   PCP: Patient wife states patient has new PCP appointment scheduled for next week, patients wife unsure of name of PCP, patient wife states appointment is at [facility] in [place]  Patient and patients wife have declined any home going needs.

## 2024-02-06 NOTE — DISCHARGE SUMMARY
Discharge diagnosis  #Atypical chest pain/ Angina s/p PCI  #Hypertension  #Hyperlipidemia    Hospital course  67 yo M with PMHx of HTN (not currently on medications) presented with non exertional, intermittent, 6/10, L sided, squeezing chest pressure with radiation to his L shoulder and arm accompanied with mild sob.  Cardiology is consulted.  He underwent stress testing which was found to be abnormal.  He subsequently underwent cardiac catheterization and had TIMMY placed in the distal LAD with balloon angioplasty of the apical LAD.  He has been started on DAPT and high intensity statin.  His blood pressure was also managed with addition of amlodipine and Coreg.  Patient will be discharged home.  He will need to follow-up with cardiology outpatient.    All plans and goals of care were discussed and agreed upon with the patient.      Vitals:    02/06/24 0900   BP: 171/86   Pulse: 61   Resp: 20   Temp: 36.3 °C (97.3 °F)   SpO2: 93%      Physical exam   GENERAL: Awake/ alert, cooperative.   HEAD:  Normocephalic, atraumatic.  EYES:  Round and reactive.  ENT:  No nasal discharge, mucous membranes moist and pink.  NECK:  Atraumatic, no meningismus.  CARDIOVASCULAR:  Regular rate and rhythm, no murmur.  RESPIRATORY:  Clear breath sounds, no active work of breathing.   ABDOMEN:  Soft, no tenderness, nondistended.  EXTREMITIES:  No peripheral edema, no calf tenderness.  SKIN:  Warm and dry.  NEUROLOGICAL:  Nonfocal grossly.     Home-going medications     Medication List      START taking these medications     amLODIPine 5 mg tablet; Commonly known as: Norvasc; Take 1 tablet (5 mg)   by mouth once daily. Do not start before February 7, 2024.; Start taking   on: February 7, 2024   atorvastatin 80 mg tablet; Commonly known as: Lipitor; Take 1 tablet (80   mg) by mouth once daily at bedtime.   carvedilol 12.5 mg tablet; Commonly known as: Coreg; Take 1 tablet (12.5   mg) by mouth 2 times a day.   clopidogrel 75 mg tablet;  Commonly known as: Plavix; Take 1 tablet (75   mg) by mouth once daily.     CONTINUE taking these medications     aspirin 81 mg EC tablet       Outpatient follow-up instructions and medication changes    -Follow-up with cardiology    Darrell Leger D.O.  Internal Medicine PGY-3

## 2024-02-06 NOTE — CARE PLAN
Report called to 9th floor RN, Iv's patent, TR band removed with clean dry dressing applied. No bleeding or hematoma. No complaint of pain.

## 2024-02-06 NOTE — PROGRESS NOTES
Subjective Data:  Patient denies any chest discomfort.  Denies any shortness of breath.    Overnight Events:    Underwent cardiac catheterization with stenting of the LAD yesterday.  Had moderate left PDA disease.     Objective Data:  Last Recorded Vitals:  Vitals:    02/05/24 1537 02/05/24 1945 02/05/24 2336 02/06/24 0345   BP:  157/81 147/77 148/84   BP Location:    Left arm   Patient Position:    Lying   Pulse:   63 59   Resp:   18 20   Temp:  36.1 °C (97 °F) 36.6 °C (97.9 °F) 36.2 °C (97.2 °F)   TempSrc:   Temporal Temporal   SpO2: 96%  92% 91%   Weight:       Height:           Last Labs:  CBC - 2/6/2024:  5:52 AM  5.8 14.8 212    44.8      CMP - 2/6/2024:  5:52 AM  8.8 6.7 14 --- 0.6   _ 3.8 25 55      PTT - No results in last year.  _   _ _     TROPHS   Date/Time Value Ref Range Status   02/04/2024 08:07 AM 4 0 - 20 ng/L Final   02/03/2024 06:11 PM 5 0 - 20 ng/L Final   02/03/2024 03:31 PM 5 0 - 20 ng/L Final     BNP   Date/Time Value Ref Range Status   02/03/2024 03:31 PM 21 0 - 99 pg/mL Final     HGBA1C   Date/Time Value Ref Range Status   02/04/2024 08:07 AM 6.0 see below % Final     LDLCALC   Date/Time Value Ref Range Status   02/04/2024 08:07  <=99 mg/dL Final     Comment:                                 Near   Borderline      AGE      Desirable  Optimal    High     High     Very High     0-19 Y     0 - 109     ---    110-129   >/= 130     ----    20-24 Y     0 - 119     ---    120-159   >/= 160     ----      >24 Y     0 -  99   100-129  130-159   160-189     >/=190       VLDL   Date/Time Value Ref Range Status   02/04/2024 08:07 AM 19 0 - 40 mg/dL Final      Last I/O:  I/O last 3 completed shifts:  In: 809.1 (8.7 mL/kg) [I.V.:809.1 (8.7 mL/kg)]  Out: 2 (0 mL/kg) [Blood:2]  Weight: 93 kg     Past Cardiology Tests (Last 3 Years):  EKG:  No results found for this or any previous visit from the past 1095 days.    Echo:  No results found for this or any previous visit from the past 1095  "days.    Ejection Fractions:  No results found for: \"EF\"  Cath:  No results found for this or any previous visit from the past 1095 days.    Stress Test:  No results found for this or any previous visit from the past 1095 days.    Cardiac Imaging:  No results found for this or any previous visit from the past 1095 days.      Inpatient Medications:  Scheduled medications   Medication Dose Route Frequency    amLODIPine  5 mg oral Daily    aspirin  81 mg oral Daily    atorvastatin  80 mg oral Nightly    carvedilol  12.5 mg oral BID    clopidogrel  75 mg oral Daily    enoxaparin  40 mg subcutaneous q24h    magnesium sulfate  2 g intravenous Once    perflutren lipid microspheres  0.5-10 mL of dilution intravenous Once in imaging     PRN medications   Medication    acetaminophen    hydrALAZINE    nitroglycerin    ondansetron    oxygen     Continuous Medications   Medication Dose Last Rate       Physical Exam:  Physical Exam  Vitals reviewed.   Constitutional:       Appearance: Normal appearance.   HENT:      Head: Normocephalic and atraumatic.      Mouth/Throat:      Mouth: Mucous membranes are moist.      Pharynx: Oropharynx is clear.   Eyes:      Extraocular Movements: Extraocular movements intact.      Conjunctiva/sclera: Conjunctivae normal.   Cardiovascular:      Rate and Rhythm: Normal rate and regular rhythm.      Pulses: Normal pulses.      Heart sounds: Normal heart sounds.   Pulmonary:      Effort: Pulmonary effort is normal.      Breath sounds: Normal breath sounds.   Abdominal:      General: Bowel sounds are normal.      Palpations: Abdomen is soft.   Musculoskeletal:      Cervical back: Neck supple.   Skin:     General: Skin is warm and dry.   Neurological:      General: No focal deficit present.      Mental Status: He is alert.   Psychiatric:         Mood and Affect: Mood normal.         Behavior: Behavior normal.           Assessment/Plan   2/5/2024:  1.  Chest pain: Still has intermittent discomfort.  " Uncertain if this is related to hypertension versus coronary artery disease.  Will go ahead and increase carvedilol to 12.5 mg twice daily.  Will continue amlodipine, aspirin and statin therapy.  Noninvasive stress testing is abnormal with an ejection fraction 53%, prior inferior myocardial infarction.  Will make n.p.o. for cardiac catheterization tomorrow.  2.  Hypertension: As above.    2/6/2024  1.  Chest pain: Noninvasive stress testing yesterday was abnormal.  Underwent cardiac catheterization with TIMMY of the distal LAD, balloon angioplasty of the apical LAD.  Had moderate left PDA disease.  Patient will stay on DAPT for 6 to 12 months in addition to statin therapy.  2.  Hypertension: Blood pressures improved on carvedilol 12.5 mg twice daily and amlodipine.  Will consider addition of hydrochlorothiazide as an outpatient.  3.  Echocardiogram is pending.  4.  Disposition: If the patient does well, he be stable for discharge from a cardiac standpoint.  He can follow-up with me as an outpatient.    Peripheral IV 02/03/24 18 G Left Antecubital (Active)   Site Assessment Intact;Dry;Clean 02/05/24 0900   Dressing Status Clean;Dry 02/05/24 0900   Number of days: 2       Peripheral IV 02/03/24 18 G Right Forearm (Active)   Site Assessment Intact;Dry;Clean 02/05/24 0900   Dressing Status Clean;Dry 02/05/24 0900   Number of days: 2       Code Status:  Full Code    Saurabh Youngblood MD

## 2024-02-07 LAB
ATRIAL RATE: 58 BPM
P AXIS: 32 DEGREES
P OFFSET: 182 MS
P ONSET: 123 MS
PR INTERVAL: 164 MS
Q ONSET: 205 MS
QRS COUNT: 9 BEATS
QRS DURATION: 110 MS
QT INTERVAL: 446 MS
QTC CALCULATION(BAZETT): 437 MS
QTC FREDERICIA: 440 MS
R AXIS: -55 DEGREES
T AXIS: -36 DEGREES
T OFFSET: 428 MS
VENTRICULAR RATE: 58 BPM

## 2024-02-08 LAB
ATRIAL RATE: 107 BPM
ATRIAL RATE: 84 BPM
P AXIS: 41 DEGREES
P AXIS: 50 DEGREES
PR INTERVAL: 149 MS
PR INTERVAL: 155 MS
Q ONSET: 252 MS
Q ONSET: 253 MS
QRS COUNT: 13 BEATS
QRS COUNT: 17 BEATS
QRS DURATION: 108 MS
QRS DURATION: 112 MS
QT INTERVAL: 350 MS
QT INTERVAL: 381 MS
QTC CALCULATION(BAZETT): 445 MS
QTC CALCULATION(BAZETT): 467 MS
QTC FREDERICIA: 422 MS
QTC FREDERICIA: 424 MS
R AXIS: -41 DEGREES
R AXIS: -47 DEGREES
T AXIS: -2 DEGREES
T AXIS: 18 DEGREES
T OFFSET: 427 MS
T OFFSET: 443 MS
VENTRICULAR RATE: 107 BPM
VENTRICULAR RATE: 82 BPM

## 2024-02-26 PROBLEM — I25.10 CAD (CORONARY ARTERY DISEASE): Status: ACTIVE | Noted: 2024-02-26

## 2024-03-01 ENCOUNTER — OFFICE VISIT (OUTPATIENT)
Dept: CARDIOLOGY | Facility: CLINIC | Age: 69
End: 2024-03-01
Payer: MEDICARE

## 2024-03-01 VITALS
OXYGEN SATURATION: 98 % | HEIGHT: 68 IN | HEART RATE: 57 BPM | SYSTOLIC BLOOD PRESSURE: 162 MMHG | WEIGHT: 221.4 LBS | DIASTOLIC BLOOD PRESSURE: 80 MMHG | BODY MASS INDEX: 33.56 KG/M2

## 2024-03-01 DIAGNOSIS — I25.10 CORONARY ARTERY DISEASE INVOLVING NATIVE CORONARY ARTERY OF NATIVE HEART WITHOUT ANGINA PECTORIS: ICD-10-CM

## 2024-03-01 DIAGNOSIS — Z95.5 S/P DRUG ELUTING CORONARY STENT PLACEMENT: Primary | ICD-10-CM

## 2024-03-01 DIAGNOSIS — I10 PRIMARY HYPERTENSION: ICD-10-CM

## 2024-03-01 PROCEDURE — 1160F RVW MEDS BY RX/DR IN RCRD: CPT | Performed by: INTERNAL MEDICINE

## 2024-03-01 PROCEDURE — 1036F TOBACCO NON-USER: CPT | Performed by: INTERNAL MEDICINE

## 2024-03-01 PROCEDURE — 3077F SYST BP >= 140 MM HG: CPT | Performed by: INTERNAL MEDICINE

## 2024-03-01 PROCEDURE — 3079F DIAST BP 80-89 MM HG: CPT | Performed by: INTERNAL MEDICINE

## 2024-03-01 PROCEDURE — 93000 ELECTROCARDIOGRAM COMPLETE: CPT | Performed by: INTERNAL MEDICINE

## 2024-03-01 PROCEDURE — 1111F DSCHRG MED/CURRENT MED MERGE: CPT | Performed by: INTERNAL MEDICINE

## 2024-03-01 PROCEDURE — 99215 OFFICE O/P EST HI 40 MIN: CPT | Performed by: INTERNAL MEDICINE

## 2024-03-01 PROCEDURE — 1159F MED LIST DOCD IN RCRD: CPT | Performed by: INTERNAL MEDICINE

## 2024-03-01 PROCEDURE — 1126F AMNT PAIN NOTED NONE PRSNT: CPT | Performed by: INTERNAL MEDICINE

## 2024-03-01 NOTE — PROGRESS NOTES
[facility] Cardiology Outpatient Follow-up Visit     Reason for Visit: CAD    HPI: Satya Camarillo is a 69 y.o.  male who presents today for followup.     Patient is a 69-year-old male who initially presented with angina.  He admitted to exertional chest discomfort over the past 3 days prior to admission.  Patient was noted to be hypertensive. He underwent echocardiogram as well as noninvasive stress testing.  He underwent cardiac catheterization.  He presents today for follow-up.    He denies any chest discomfort, shortness of breath, palpitations, lightheadedness, syncope, orthopnea, PND, lower extremity edema.  He does admit to some facial swelling that occurred 3-4 times since hospitalization.  This Sunday, after Orthodoxy he had a pretty significant reaction with issues being able to eat due to mouth soreness.  He has a prior history of angioedema to captopril.    2/6/2024 echo: Ejection fraction 55 to 60%, impaired relaxation.  2/5/2024 MPI: Abnormal perfusion with evidence for small nontransmural inferior wall myocardial infarction without significant ischemia, ejection fraction 43% with inferior wall motion abnormality.  2/5/2024 cardiac catheterization: Severe distal and apical LAD disease status post balloon angioplasty and Synergy TIMMY.  Moderate left PDA disease suitable for medical therapy, mild nondominant RCA disease, normal left heart filling pressures.    Past Medical History:   He has no past medical history on file.    Surgical History:   He has a past surgical history that includes Cardiac catheterization (N/A, 2/5/2024).    Family History:   No family history on file.    Allergies:  Captopril     Social History:   No cigarettes or drugs.    Prior Cardiovascular Testing (Personally Reviewed):     Review of Systems:  Review of Systems   All other systems reviewed and are negative.      Outpatient Medications:    Current Outpatient Medications:     amLODIPine (Norvasc) 5 mg tablet, Take 1 tablet  "(5 mg) by mouth once daily. Do not start before February 7, 2024., Disp: 30 tablet, Rfl: 0    aspirin 81 mg EC tablet, Take 1 tablet (81 mg) by mouth once daily., Disp: , Rfl:     atorvastatin (Lipitor) 80 mg tablet, Take 1 tablet (80 mg) by mouth once daily at bedtime., Disp: 30 tablet, Rfl: 11    carvedilol (Coreg) 12.5 mg tablet, Take 1 tablet (12.5 mg) by mouth 2 times a day., Disp: 60 tablet, Rfl: 0    clopidogrel (Plavix) 75 mg tablet, Take 1 tablet (75 mg) by mouth once daily., Disp: 30 tablet, Rfl: 11     Last Recorded Vitals  /80 (BP Location: Left arm, Patient Position: Sitting, BP Cuff Size: Adult)   Pulse 57   Ht 1.727 m (5' 8\")   Wt 100 kg (221 lb 6.4 oz)   SpO2 98%   BMI 33.66 kg/m²     Physical Exam:    Physical Exam  Vitals reviewed.   Constitutional:       Appearance: Normal appearance.   HENT:      Head: Normocephalic and atraumatic.      Mouth/Throat:      Mouth: Mucous membranes are moist.      Pharynx: Oropharynx is clear.   Eyes:      Extraocular Movements: Extraocular movements intact.      Conjunctiva/sclera: Conjunctivae normal.   Cardiovascular:      Rate and Rhythm: Normal rate and regular rhythm.      Pulses: Normal pulses.      Heart sounds: Normal heart sounds.   Pulmonary:      Effort: Pulmonary effort is normal.      Breath sounds: Normal breath sounds.   Abdominal:      General: Bowel sounds are normal.      Palpations: Abdomen is soft.   Musculoskeletal:      Cervical back: Neck supple.   Skin:     General: Skin is warm and dry.   Neurological:      General: No focal deficit present.      Mental Status: He is alert.   Psychiatric:         Mood and Affect: Mood normal.         Behavior: Behavior normal.         Lab/Radiology/Diagnostic Review:    Labs    Lab Results   Component Value Date    GLUCOSE 105 (H) 02/06/2024    CALCIUM 8.8 02/06/2024     02/06/2024    K 4.3 02/06/2024    CO2 23 02/06/2024     (H) 02/06/2024    BUN 17 02/06/2024    CREATININE 1.02 " "02/06/2024       Lab Results   Component Value Date    WBC 5.8 02/06/2024    HGB 14.8 02/06/2024    HCT 44.8 02/06/2024    MCV 90 02/06/2024     02/06/2024       Lab Results   Component Value Date    CHOL 189 02/04/2024     Lab Results   Component Value Date    HDL 42.7 02/04/2024     Lab Results   Component Value Date    LDLCALC 127 (H) 02/04/2024     Lab Results   Component Value Date    TRIG 96 02/04/2024     No components found for: \"CHOLHDL\"    Lab Results   Component Value Date    BNP 21 02/03/2024       No results found for: \"TSH\"    Assessment:   Uncertain which of his new medications are causing his issue.  He was on aspirin therapy prior to admission in February.  His new medications include amlodipine, Lipitor, carvedilol and clopidogrel.    My biggest concern is that the clopidogrel is the issue.  Will go ahead and have him try Brilinta 90 mg twice daily and stop clopidogrel.  We gave him a 1 month trial of this medication.  I did  him that some people can get short of breath on this medication.    If is not this medication, we will try switching around his other medications and putting him back on clopidogrel.    Patient will eventually need an additional medication for blood pressure given hypertension.  Would not want to make any changes or additions at this time given more recent facial swelling.  Will address this at his next visit.    Patient will follow-up with me within 1 month or sooner if he has more problems.    Saurabh Youngblood MD      "

## 2024-03-05 ENCOUNTER — TELEPHONE (OUTPATIENT)
Dept: CARDIOLOGY | Facility: CLINIC | Age: 69
End: 2024-03-05
Payer: MEDICARE

## 2024-03-05 NOTE — TELEPHONE ENCOUNTER
Pt's wife called stating patient is having increased SOB. Pt recently started on Brilinta on 3/1/24. Pt's wife states pt has not had facial swelling since last OV.

## 2024-03-06 NOTE — TELEPHONE ENCOUNTER
Patient's wife aware. Instructed to continue taking medication and monitor symptoms. Instructed to call office with any questions/concerns.

## 2024-03-15 DIAGNOSIS — I15.9 SECONDARY HYPERTENSION: Primary | ICD-10-CM

## 2024-03-15 RX ORDER — AMLODIPINE BESYLATE 5 MG/1
5 TABLET ORAL DAILY
Qty: 30 TABLET | Refills: 11 | Status: SHIPPED | OUTPATIENT
Start: 2024-03-15

## 2024-03-15 RX ORDER — CARVEDILOL 12.5 MG/1
12.5 TABLET ORAL 2 TIMES DAILY
Qty: 60 TABLET | Refills: 11 | Status: SHIPPED | OUTPATIENT
Start: 2024-03-15 | End: 2024-04-05 | Stop reason: SDUPTHER

## 2024-03-29 PROBLEM — I20.2 REFRACTORY ANGINA PECTORIS (CMS-HCC): Status: ACTIVE | Noted: 2024-03-29

## 2024-03-29 PROBLEM — R06.02 SHORTNESS OF BREATH: Status: ACTIVE | Noted: 2024-03-29

## 2024-03-29 PROBLEM — E78.2 HYPERLIPIDEMIA, MIXED: Status: ACTIVE | Noted: 2024-02-13

## 2024-03-29 PROBLEM — R93.1 ABNORMAL FINDINGS ON DIAGNOSTIC IMAGING OF HEART AND CORONARY CIRCULATION: Status: ACTIVE | Noted: 2024-03-29

## 2024-04-05 ENCOUNTER — OFFICE VISIT (OUTPATIENT)
Dept: CARDIOLOGY | Facility: CLINIC | Age: 69
End: 2024-04-05
Payer: MEDICARE

## 2024-04-05 VITALS
HEART RATE: 76 BPM | OXYGEN SATURATION: 96 % | DIASTOLIC BLOOD PRESSURE: 88 MMHG | WEIGHT: 221.8 LBS | BODY MASS INDEX: 33.62 KG/M2 | SYSTOLIC BLOOD PRESSURE: 164 MMHG | HEIGHT: 68 IN

## 2024-04-05 DIAGNOSIS — E78.2 HYPERLIPIDEMIA, MIXED: ICD-10-CM

## 2024-04-05 DIAGNOSIS — I15.9 SECONDARY HYPERTENSION: ICD-10-CM

## 2024-04-05 DIAGNOSIS — I25.10 CORONARY ARTERY DISEASE INVOLVING NATIVE CORONARY ARTERY OF NATIVE HEART WITHOUT ANGINA PECTORIS: ICD-10-CM

## 2024-04-05 DIAGNOSIS — I10 PRIMARY HYPERTENSION: Primary | ICD-10-CM

## 2024-04-05 PROCEDURE — 1159F MED LIST DOCD IN RCRD: CPT | Performed by: INTERNAL MEDICINE

## 2024-04-05 PROCEDURE — 99214 OFFICE O/P EST MOD 30 MIN: CPT | Performed by: INTERNAL MEDICINE

## 2024-04-05 PROCEDURE — 3079F DIAST BP 80-89 MM HG: CPT | Performed by: INTERNAL MEDICINE

## 2024-04-05 PROCEDURE — 3077F SYST BP >= 140 MM HG: CPT | Performed by: INTERNAL MEDICINE

## 2024-04-05 PROCEDURE — 1160F RVW MEDS BY RX/DR IN RCRD: CPT | Performed by: INTERNAL MEDICINE

## 2024-04-05 RX ORDER — CARVEDILOL 12.5 MG/1
25 TABLET ORAL 2 TIMES DAILY
Qty: 180 TABLET | Refills: 3 | Status: SHIPPED | OUTPATIENT
Start: 2024-04-05

## 2024-04-12 ENCOUNTER — CLINICAL SUPPORT (OUTPATIENT)
Dept: CARDIOLOGY | Facility: CLINIC | Age: 69
End: 2024-04-12
Payer: MEDICARE

## 2024-04-12 VITALS
OXYGEN SATURATION: 98 % | HEART RATE: 61 BPM | SYSTOLIC BLOOD PRESSURE: 128 MMHG | WEIGHT: 221 LBS | BODY MASS INDEX: 33.6 KG/M2 | DIASTOLIC BLOOD PRESSURE: 70 MMHG

## 2024-04-12 ASSESSMENT — PAIN SCALES - GENERAL: PAINLEVEL: 0-NO PAIN

## 2024-04-12 NOTE — PROGRESS NOTES
Patients Carvedilol was increased to 25mg BID. BP today was 130/78. Will continue this dose and no need to increase amlodine.  Patient feeling well on increased dose.

## 2024-09-25 DIAGNOSIS — I25.10 CORONARY ARTERY DISEASE INVOLVING NATIVE CORONARY ARTERY OF NATIVE HEART WITHOUT ANGINA PECTORIS: ICD-10-CM

## 2024-09-26 RX ORDER — TICAGRELOR 90 MG/1
90 TABLET ORAL 2 TIMES DAILY
Qty: 60 TABLET | Refills: 1 | Status: SHIPPED | OUTPATIENT
Start: 2024-09-26

## 2024-10-11 ENCOUNTER — APPOINTMENT (OUTPATIENT)
Dept: CARDIOLOGY | Facility: CLINIC | Age: 69
End: 2024-10-11
Payer: MEDICARE

## 2024-11-05 ENCOUNTER — APPOINTMENT (OUTPATIENT)
Dept: CARDIOLOGY | Facility: CLINIC | Age: 69
End: 2024-11-05
Payer: MEDICARE

## 2024-11-05 VITALS
OXYGEN SATURATION: 95 % | DIASTOLIC BLOOD PRESSURE: 94 MMHG | SYSTOLIC BLOOD PRESSURE: 182 MMHG | HEART RATE: 66 BPM | WEIGHT: 214 LBS | BODY MASS INDEX: 32.43 KG/M2 | HEIGHT: 68 IN

## 2024-11-05 DIAGNOSIS — R07.9 CHEST PAIN, UNSPECIFIED TYPE: ICD-10-CM

## 2024-11-05 DIAGNOSIS — I25.10 CAD (CORONARY ARTERY DISEASE): Primary | ICD-10-CM

## 2024-11-05 DIAGNOSIS — E78.2 HYPERLIPIDEMIA, MIXED: ICD-10-CM

## 2024-11-05 DIAGNOSIS — I10 PRIMARY HYPERTENSION: ICD-10-CM

## 2024-11-05 PROCEDURE — 3077F SYST BP >= 140 MM HG: CPT | Performed by: INTERNAL MEDICINE

## 2024-11-05 PROCEDURE — 1159F MED LIST DOCD IN RCRD: CPT | Performed by: INTERNAL MEDICINE

## 2024-11-05 PROCEDURE — 1036F TOBACCO NON-USER: CPT | Performed by: INTERNAL MEDICINE

## 2024-11-05 PROCEDURE — 3080F DIAST BP >= 90 MM HG: CPT | Performed by: INTERNAL MEDICINE

## 2024-11-05 PROCEDURE — 99214 OFFICE O/P EST MOD 30 MIN: CPT | Performed by: INTERNAL MEDICINE

## 2024-11-05 PROCEDURE — 3008F BODY MASS INDEX DOCD: CPT | Performed by: INTERNAL MEDICINE

## 2024-11-05 RX ORDER — CLOPIDOGREL BISULFATE 75 MG/1
75 TABLET ORAL
COMMUNITY
Start: 2024-02-06 | End: 2024-11-05

## 2024-11-05 RX ORDER — CARVEDILOL 12.5 MG/1
25 TABLET ORAL 2 TIMES DAILY
Qty: 360 TABLET | Refills: 3 | Status: SHIPPED | OUTPATIENT
Start: 2024-11-05 | End: 2025-11-05

## 2024-11-05 RX ORDER — AMLODIPINE BESYLATE 5 MG/1
5 TABLET ORAL DAILY
Qty: 90 TABLET | Refills: 3 | Status: SHIPPED | OUTPATIENT
Start: 2024-11-05 | End: 2025-11-05

## 2024-11-05 RX ORDER — ATORVASTATIN CALCIUM 80 MG/1
80 TABLET, FILM COATED ORAL NIGHTLY
Qty: 90 TABLET | Refills: 3 | Status: SHIPPED | OUTPATIENT
Start: 2024-11-05 | End: 2025-11-05

## 2024-11-05 NOTE — PROGRESS NOTES
[facility] Cardiology Outpatient Follow-up Visit     Reason for Visit: CAD     HPI: Satya Camarillo is a 69 y.o.  male who presents today for followup.      Patient is a 69-year-old male who initially presented with angina.  He admitted to exertional chest discomfort over the past 3 days prior to admission.  Patient was noted to be hypertensive. He underwent echocardiogram as well as noninvasive stress testing.  He underwent cardiac catheterization.  He presents today for follow-up.    He did not take his blood pressure medications this morning.     He denies any chest discomfort, shortness of breath, palpitations, lightheadedness, syncope, orthopnea, PND, lower extremity edema.  He does admit to occasional dyspnea on exertion.  He does admit to some facial swelling that occurred 3-4 times since hospitalization.  This resolved after switching clopidogrel to ticagrelor.  He has a prior history of angioedema to captopril.     2/6/2024 echo: Ejection fraction 55 to 60%, impaired relaxation.  2/5/2024 MPI: Abnormal perfusion with evidence for small nontransmural inferior wall myocardial infarction without significant ischemia, ejection fraction 43% with inferior wall motion abnormality.  2/5/2024 cardiac catheterization: Severe distal and apical LAD disease status post balloon angioplasty and Synergy TIMMY.  Moderate left PDA disease suitable for medical therapy, mild nondominant RCA disease, normal left heart filling pressures.     Past Medical History:   He has no past medical history on file.    Surgical History:   He has a past surgical history that includes Cardiac catheterization (N/A, 2/5/2024).    Family History:   No family history on file.    Allergies:  Captopril and Plavix [clopidogrel]     Social History:   No cigarettes or drugs.     Prior Cardiovascular Testing (Personally Reviewed):     Review of Systems:  Review of Systems   All other systems reviewed and are negative.      Outpatient  Medications:    Current Outpatient Medications:     amLODIPine (Norvasc) 5 mg tablet, Take 1 tablet (5 mg) by mouth once daily., Disp: 30 tablet, Rfl: 11    aspirin 81 mg EC tablet, Take 1 tablet (81 mg) by mouth once daily., Disp: , Rfl:     atorvastatin (Lipitor) 80 mg tablet, Take 1 tablet (80 mg) by mouth once daily at bedtime., Disp: 30 tablet, Rfl: 11    Brilinta 90 mg tablet, TAKE 1 TABLET BY MOUTH 2 TIMES A DAY., Disp: 60 tablet, Rfl: 1    carvedilol (Coreg) 12.5 mg tablet, Take 2 tablets (25 mg) by mouth 2 times a day., Disp: 180 tablet, Rfl: 3     Last Recorded Vitals  There were no vitals taken for this visit.    Physical Exam:    Physical Exam  Vitals reviewed.   Constitutional:       Appearance: Normal appearance.   HENT:      Head: Normocephalic and atraumatic.      Mouth/Throat:      Mouth: Mucous membranes are moist.      Pharynx: Oropharynx is clear.   Eyes:      Extraocular Movements: Extraocular movements intact.      Conjunctiva/sclera: Conjunctivae normal.   Cardiovascular:      Rate and Rhythm: Normal rate and regular rhythm.      Pulses: Normal pulses.      Heart sounds: Normal heart sounds.   Pulmonary:      Effort: Pulmonary effort is normal.      Breath sounds: Normal breath sounds.   Abdominal:      General: Bowel sounds are normal.      Palpations: Abdomen is soft.   Musculoskeletal:         General: No swelling.      Cervical back: Neck supple.   Skin:     General: Skin is warm and dry.   Neurological:      General: No focal deficit present.      Mental Status: He is alert.   Psychiatric:         Mood and Affect: Mood normal.         Behavior: Behavior normal.         Lab/Radiology/Diagnostic Review:    Labs    Lab Results   Component Value Date    GLUCOSE 105 (H) 02/06/2024    CALCIUM 8.8 02/06/2024     02/06/2024    K 4.3 02/06/2024    CO2 23 02/06/2024     (H) 02/06/2024    BUN 17 02/06/2024    CREATININE 1.02 02/06/2024       Lab Results   Component Value Date    WBC 5.8  "02/06/2024    HGB 14.8 02/06/2024    HCT 44.8 02/06/2024    MCV 90 02/06/2024     02/06/2024       Lab Results   Component Value Date    CHOL 189 02/04/2024     Lab Results   Component Value Date    HDL 42.7 02/04/2024     Lab Results   Component Value Date    LDLCALC 127 (H) 02/04/2024     Lab Results   Component Value Date    TRIG 96 02/04/2024     No components found for: \"CHOLHDL\"    Lab Results   Component Value Date    BNP 21 02/03/2024       No results found for: \"TSH\"    Assessment:   Facial swelling and mouth soreness resolved after switching clopidogrel to ticagrelor.     He is currently on aspirin monotherapy.     Patient has a history of angioedema to captopril.  He is on carvedilol and amlodipine for blood pressure control.  He will come in for blood pressure check in 1 week.  If this is insufficient to control his blood pressure, will consider starting hydrochlorothiazide.       Patient will follow-up with us within 6 months or sooner if he has more problems.    Saurabh Youngblood MD      "

## 2024-11-12 ENCOUNTER — TELEPHONE (OUTPATIENT)
Dept: CARDIOLOGY | Facility: CLINIC | Age: 69
End: 2024-11-12

## 2024-11-12 ENCOUNTER — CLINICAL SUPPORT (OUTPATIENT)
Dept: CARDIOLOGY | Facility: CLINIC | Age: 69
End: 2024-11-12
Payer: MEDICARE

## 2024-11-12 VITALS — HEART RATE: 67 BPM | SYSTOLIC BLOOD PRESSURE: 150 MMHG | OXYGEN SATURATION: 95 % | DIASTOLIC BLOOD PRESSURE: 84 MMHG

## 2024-11-12 DIAGNOSIS — I10 PRIMARY HYPERTENSION: ICD-10-CM

## 2024-11-12 DIAGNOSIS — I10 PRIMARY HYPERTENSION: Primary | ICD-10-CM

## 2024-11-12 RX ORDER — HYDROCHLOROTHIAZIDE 25 MG/1
25 TABLET ORAL DAILY
Qty: 30 TABLET | Refills: 11 | Status: SHIPPED | OUTPATIENT
Start: 2024-11-12 | End: 2025-11-12

## 2024-11-12 NOTE — TELEPHONE ENCOUNTER
----- Message from Saurabh Youngblood sent at 11/12/2024 10:04 AM EST -----  Try hydrochlorothiazide 25 daily. BMP 1 week    PP  ----- Message -----  From: Cynthia Mann RN  Sent: 11/12/2024   8:50 AM EST  To: Saurabh Youngblood MD    BP Check today. 150/84. Do you want him to cont to monitor or add the hydrochlorothiazide as indicated in your note?

## 2024-11-12 NOTE — PROGRESS NOTES
Patient arrives ambulatory from home with wife. Had OV 11/5/24 and bp elevated- 182/94. Patient said he did not take morning meds before office visit because he was fasting for lab work that may be ordered.  Bp today as documented. Patient took am meds at 0730- Amlodipine 5mg and Carvedilol 12.5mg.  Advised to check bp this afternoon as well and call nurse. Contact info provided. Advised will review with Dr. Youngblood and call for any changes to plan of care. Patient and wife verb understanding and agreeable.    Patient asking who he should follow up with in 6mos- appt made with Dr. Lowry for continuation of care.

## 2024-11-12 NOTE — TELEPHONE ENCOUNTER
Left detailed message for patient with new med and need for lab. Orders placed. Rx pended to Provider.

## 2025-03-27 ENCOUNTER — HOSPITAL ENCOUNTER (OUTPATIENT)
Dept: CARDIOLOGY | Facility: HOSPITAL | Age: 70
Discharge: HOME | End: 2025-03-27
Payer: MEDICARE

## 2025-03-27 ENCOUNTER — OFFICE VISIT (OUTPATIENT)
Dept: CARDIOLOGY | Facility: CLINIC | Age: 70
End: 2025-03-27
Payer: MEDICARE

## 2025-03-27 VITALS
RESPIRATION RATE: 16 BRPM | HEART RATE: 53 BPM | OXYGEN SATURATION: 96 % | DIASTOLIC BLOOD PRESSURE: 93 MMHG | WEIGHT: 219 LBS | SYSTOLIC BLOOD PRESSURE: 165 MMHG | BODY MASS INDEX: 33.3 KG/M2

## 2025-03-27 DIAGNOSIS — E78.2 HYPERLIPIDEMIA, MIXED: ICD-10-CM

## 2025-03-27 DIAGNOSIS — T78.3XXD ANGIOEDEMA, SUBSEQUENT ENCOUNTER: ICD-10-CM

## 2025-03-27 DIAGNOSIS — I25.10 CORONARY ARTERY DISEASE INVOLVING NATIVE CORONARY ARTERY OF NATIVE HEART WITHOUT ANGINA PECTORIS: ICD-10-CM

## 2025-03-27 DIAGNOSIS — I25.10 CORONARY ARTERY DISEASE INVOLVING NATIVE CORONARY ARTERY OF NATIVE HEART WITHOUT ANGINA PECTORIS: Primary | ICD-10-CM

## 2025-03-27 PROBLEM — E66.811 CLASS 1 OBESITY WITH BODY MASS INDEX (BMI) OF 32.0 TO 32.9 IN ADULT: Status: ACTIVE | Noted: 2025-03-27

## 2025-03-27 PROBLEM — T78.3XXA ANGIO-EDEMA: Status: ACTIVE | Noted: 2025-03-27

## 2025-03-27 PROCEDURE — 3077F SYST BP >= 140 MM HG: CPT | Performed by: STUDENT IN AN ORGANIZED HEALTH CARE EDUCATION/TRAINING PROGRAM

## 2025-03-27 PROCEDURE — 99214 OFFICE O/P EST MOD 30 MIN: CPT | Mod: 25 | Performed by: STUDENT IN AN ORGANIZED HEALTH CARE EDUCATION/TRAINING PROGRAM

## 2025-03-27 PROCEDURE — 3080F DIAST BP >= 90 MM HG: CPT | Performed by: STUDENT IN AN ORGANIZED HEALTH CARE EDUCATION/TRAINING PROGRAM

## 2025-03-27 PROCEDURE — 1159F MED LIST DOCD IN RCRD: CPT | Performed by: STUDENT IN AN ORGANIZED HEALTH CARE EDUCATION/TRAINING PROGRAM

## 2025-03-27 PROCEDURE — 1160F RVW MEDS BY RX/DR IN RCRD: CPT | Performed by: STUDENT IN AN ORGANIZED HEALTH CARE EDUCATION/TRAINING PROGRAM

## 2025-03-27 PROCEDURE — 99214 OFFICE O/P EST MOD 30 MIN: CPT | Performed by: STUDENT IN AN ORGANIZED HEALTH CARE EDUCATION/TRAINING PROGRAM

## 2025-03-27 RX ORDER — EPINEPHRINE 0.15 MG/.3ML
1 INJECTION INTRAMUSCULAR ONCE
Qty: 0.3 ML | Refills: 0 | Status: SHIPPED | OUTPATIENT
Start: 2025-03-27 | End: 2025-03-27

## 2025-03-27 RX ORDER — ROSUVASTATIN CALCIUM 40 MG/1
40 TABLET, COATED ORAL DAILY
COMMUNITY
Start: 2025-03-14

## 2025-03-27 NOTE — PROGRESS NOTES
Cardiology New Patient History and Physical    Reason for referral: ***    HPI: Satya Camarillo is a 70 y.o. {ethnicity:75018} male who presents today for ***. Past medical history of ***.      Past Medical History:   He has no past medical history on file.    Surgical History:   He has a past surgical history that includes Cardiac catheterization (N/A, 2/5/2024).    Family History:   No family history on file.    {cardiovascular family hx:65751}    Allergies:  Captopril and Plavix [clopidogrel]     Social History:   {Social History:18174}  Recent psychosocial stressors include: {Stressors:689793}.   Current employment includes: {desc; employment history:68266}    Prior Cardiovascular Testing (personally reviewed):     Review of Systems:  ROS    Objective     Outpatient Medications:    Current Outpatient Medications:   •  amLODIPine (Norvasc) 5 mg tablet, Take 1 tablet (5 mg) by mouth once daily., Disp: 90 tablet, Rfl: 3  •  aspirin 81 mg EC tablet, Take 1 tablet (81 mg) by mouth once daily., Disp: , Rfl:   •  atorvastatin (Lipitor) 80 mg tablet, Take 1 tablet (80 mg) by mouth once daily at bedtime., Disp: 90 tablet, Rfl: 3  •  carvedilol (Coreg) 12.5 mg tablet, Take 2 tablets (25 mg) by mouth 2 times a day., Disp: 360 tablet, Rfl: 3  •  hydroCHLOROthiazide (HYDRODiuril) 25 mg tablet, Take 1 tablet (25 mg) by mouth once daily., Disp: 30 tablet, Rfl: 11  •  rosuvastatin (Crestor) 40 mg tablet, Take 1 tablet (40 mg) by mouth once daily., Disp: , Rfl:      Last Recorded Vitals  BP (!) 165/93   Pulse 53   Resp 16   Wt 99.3 kg (219 lb)   SpO2 96%   BMI 33.30 kg/m²     Physical Exam:  Physical Exam    Lab Review:    Lab Results   Component Value Date    GLUCOSE 105 (H) 02/06/2024    CALCIUM 8.8 02/06/2024     02/06/2024    K 4.3 02/06/2024    CO2 23 02/06/2024     (H) 02/06/2024    BUN 17 02/06/2024    CREATININE 1.02 02/06/2024       Lab Results   Component Value Date    WBC 5.8 02/06/2024    HGB  "14.8 02/06/2024    HCT 44.8 02/06/2024    MCV 90 02/06/2024     02/06/2024       Lab Results   Component Value Date    CHOL 189 02/04/2024     Lab Results   Component Value Date    HDL 42.7 02/04/2024     Lab Results   Component Value Date    LDLCALC 127 (H) 02/04/2024     Lab Results   Component Value Date    TRIG 96 02/04/2024     No components found for: \"CHOLHDL\"    Lab Results   Component Value Date    BNP 21 02/03/2024       No results found for: \"TSH\"    Assessment:       Overall Plan:      Disposition: Return to cardiology clinic in *** months    Fuentes Lowry MD        " Disp: 0.3 mL, Rfl: 0    famotidine (Pepcid) 20 mg tablet, Take 1 tablet (20 mg) by mouth 2 times a day for 5 days., Disp: 10 tablet, Rfl: 0    ticagrelor (Brilinta) 90 mg tablet, Take 1 tablet (90 mg) by mouth 2 times a day., Disp: 60 tablet, Rfl: 11     Last Recorded Vitals  BP (!) 165/93   Pulse 53   Resp 16   Wt 99.3 kg (219 lb)   SpO2 96%   BMI 33.30 kg/m²     Physical Exam:  Physical Exam  Constitutional:       General: He is not in acute distress.  HENT:      Head: Normocephalic.      Mouth/Throat:      Mouth: Mucous membranes are moist.   Eyes:      Extraocular Movements: Extraocular movements intact.      Conjunctiva/sclera: Conjunctivae normal.   Neck:      Vascular: No carotid bruit or JVD.   Cardiovascular:      Rate and Rhythm: Normal rate and regular rhythm.      Heart sounds: No murmur heard.  Pulmonary:      Effort: Pulmonary effort is normal. No respiratory distress.      Breath sounds: Normal breath sounds.   Abdominal:      General: Bowel sounds are normal. There is no distension.      Palpations: Abdomen is soft.   Musculoskeletal:         General: No swelling.   Skin:     General: Skin is warm and dry.   Neurological:      General: No focal deficit present.      Mental Status: He is alert.      Cranial Nerves: No cranial nerve deficit.      Motor: No weakness.   Psychiatric:         Mood and Affect: Mood normal.         Behavior: Behavior normal.         Lab Review:    Lab Results   Component Value Date    GLUCOSE 105 (H) 02/06/2024    CALCIUM 8.8 02/06/2024     02/06/2024    K 4.3 02/06/2024    CO2 23 02/06/2024     (H) 02/06/2024    BUN 17 02/06/2024    CREATININE 1.02 02/06/2024       Lab Results   Component Value Date    WBC 5.8 02/06/2024    HGB 14.8 02/06/2024    HCT 44.8 02/06/2024    MCV 90 02/06/2024     02/06/2024       Lab Results   Component Value Date    CHOL 189 02/04/2024     Lab Results   Component Value Date    HDL 42.7 02/04/2024     Lab Results  "  Component Value Date    LDLCALC 127 (H) 02/04/2024     Lab Results   Component Value Date    TRIG 96 02/04/2024       Lab Results   Component Value Date    BNP 21 02/03/2024         Assessment:   70 y.o.  male who presents today for ischemic heart disease. Past medical history of atherosclerotic heart disease status post drug-eluting stent to the LAD (2/5/2024), primary hypertension, dyslipidemia, history of angioedema, obesity.    Of note patient notes intermittent facial and lip swelling similar to prior angioedema episodes.  Patient currently on aspirin, recommended stopping aspirin as patient's symptoms could be secondary to aspirin allergy.  Patient referred to allergy for further evaluation.  Will switch from aspirin to ticagrelor monotherapy as patient previously tolerated ticagrelor without significant side effects.    Overall Plan:  1.  Atherosclerotic heart disease status post drug-eluting stent to the distal LAD (February 2024)  - Patient currently asymptomatic  - Continue GDMT as tolerated  - Continue atorvastatin, beta-blockade, rosuvastatin  - Given possible allergy to aspirin we will stop aspirin and switch to ticagrelor monotherapy    2.  History of angioedema  - Intermittent facial and lip swelling; stop aspirin, patient referred to allergy for further evaluation  - Avoid ACE/ARB given history of angioedema    3.  Primary hypertension  - Continue amlodipine 5 mg daily, carvedilol 25 mg twice daily  - Blood pressure suboptimally controlled with an increase amlodipine to 10 mg daily  - Continue hydrochlorothiazide  - If additional antihypertensive therapy needed would then consider spironolactone    4.  Dyslipidemia  - Continue rosuvastatin 40 mg daily  - Encourage dietary lifestyle modifications    5. Discussed \"red flag\" symptoms that should prompt immediate medical attention; patient verbalized understanding    Disposition: Return to cardiology clinic in 6 months    Fuentes Lowry, " MD

## 2025-03-30 ENCOUNTER — HOSPITAL ENCOUNTER (EMERGENCY)
Facility: HOSPITAL | Age: 70
Discharge: HOME | End: 2025-03-31
Attending: STUDENT IN AN ORGANIZED HEALTH CARE EDUCATION/TRAINING PROGRAM
Payer: MEDICARE

## 2025-03-30 ENCOUNTER — APPOINTMENT (OUTPATIENT)
Dept: CARDIOLOGY | Facility: HOSPITAL | Age: 70
End: 2025-03-30
Payer: MEDICARE

## 2025-03-30 VITALS
HEIGHT: 68 IN | BODY MASS INDEX: 30.01 KG/M2 | RESPIRATION RATE: 16 BRPM | HEART RATE: 62 BPM | SYSTOLIC BLOOD PRESSURE: 171 MMHG | WEIGHT: 198 LBS | TEMPERATURE: 97.2 F | OXYGEN SATURATION: 94 % | DIASTOLIC BLOOD PRESSURE: 96 MMHG

## 2025-03-30 DIAGNOSIS — T78.40XA ALLERGIC REACTION, INITIAL ENCOUNTER: Primary | ICD-10-CM

## 2025-03-30 PROCEDURE — 99284 EMERGENCY DEPT VISIT MOD MDM: CPT | Mod: 25

## 2025-03-30 PROCEDURE — 2500000004 HC RX 250 GENERAL PHARMACY W/ HCPCS (ALT 636 FOR OP/ED): Performed by: STUDENT IN AN ORGANIZED HEALTH CARE EDUCATION/TRAINING PROGRAM

## 2025-03-30 PROCEDURE — 96375 TX/PRO/DX INJ NEW DRUG ADDON: CPT

## 2025-03-30 PROCEDURE — 96374 THER/PROPH/DIAG INJ IV PUSH: CPT

## 2025-03-30 PROCEDURE — 96372 THER/PROPH/DIAG INJ SC/IM: CPT | Mod: 59 | Performed by: STUDENT IN AN ORGANIZED HEALTH CARE EDUCATION/TRAINING PROGRAM

## 2025-03-30 PROCEDURE — 93005 ELECTROCARDIOGRAM TRACING: CPT

## 2025-03-30 RX ORDER — PREDNISONE 10 MG/1
50 TABLET ORAL DAILY
Qty: 25 TABLET | Refills: 0 | Status: SHIPPED | OUTPATIENT
Start: 2025-03-30 | End: 2025-04-04

## 2025-03-30 RX ORDER — DIPHENHYDRAMINE HCL 25 MG
25 TABLET ORAL 2 TIMES DAILY
Qty: 10 TABLET | Refills: 0 | Status: SHIPPED | OUTPATIENT
Start: 2025-03-30 | End: 2025-04-04

## 2025-03-30 RX ORDER — FAMOTIDINE 10 MG/ML
40 INJECTION, SOLUTION INTRAVENOUS ONCE
Status: COMPLETED | OUTPATIENT
Start: 2025-03-30 | End: 2025-03-30

## 2025-03-30 RX ORDER — DIPHENHYDRAMINE HYDROCHLORIDE 50 MG/ML
50 INJECTION, SOLUTION INTRAMUSCULAR; INTRAVENOUS ONCE
Status: COMPLETED | OUTPATIENT
Start: 2025-03-30 | End: 2025-03-30

## 2025-03-30 RX ORDER — EPINEPHRINE 0.3 MG/.3ML
1 INJECTION SUBCUTANEOUS ONCE AS NEEDED
Qty: 1 EACH | Refills: 0 | Status: SHIPPED | OUTPATIENT
Start: 2025-03-30

## 2025-03-30 RX ORDER — FAMOTIDINE 20 MG/1
20 TABLET, FILM COATED ORAL 2 TIMES DAILY
Qty: 10 TABLET | Refills: 0 | Status: SHIPPED | OUTPATIENT
Start: 2025-03-30 | End: 2025-04-04

## 2025-03-30 RX ORDER — EPINEPHRINE 1 MG/ML
0.3 INJECTION INTRAMUSCULAR; INTRAVENOUS; SUBCUTANEOUS ONCE
Status: COMPLETED | OUTPATIENT
Start: 2025-03-30 | End: 2025-03-30

## 2025-03-30 RX ADMIN — EPINEPHRINE 0.3 MG: 1 INJECTION INTRAMUSCULAR; INTRAVENOUS; SUBCUTANEOUS at 22:12

## 2025-03-30 RX ADMIN — METHYLPREDNISOLONE SODIUM SUCCINATE 125 MG: 125 INJECTION, POWDER, FOR SOLUTION INTRAMUSCULAR; INTRAVENOUS at 22:11

## 2025-03-30 RX ADMIN — DIPHENHYDRAMINE HYDROCHLORIDE 50 MG: 50 INJECTION, SOLUTION INTRAMUSCULAR; INTRAVENOUS at 22:12

## 2025-03-30 RX ADMIN — FAMOTIDINE 40 MG: 10 INJECTION, SOLUTION INTRAVENOUS at 22:12

## 2025-03-30 ASSESSMENT — PAIN DESCRIPTION - DESCRIPTORS: DESCRIPTORS: TIGHTNESS

## 2025-03-30 ASSESSMENT — PAIN SCALES - GENERAL: PAINLEVEL_OUTOF10: 4

## 2025-03-30 ASSESSMENT — PAIN DESCRIPTION - FREQUENCY: FREQUENCY: CONSTANT/CONTINUOUS

## 2025-03-30 ASSESSMENT — PAIN DESCRIPTION - PAIN TYPE: TYPE: ACUTE PAIN

## 2025-03-30 ASSESSMENT — PAIN DESCRIPTION - LOCATION: LOCATION: FACE

## 2025-03-30 ASSESSMENT — PAIN - FUNCTIONAL ASSESSMENT: PAIN_FUNCTIONAL_ASSESSMENT: 0-10

## 2025-03-31 PROCEDURE — 93005 ELECTROCARDIOGRAM TRACING: CPT

## 2025-03-31 PROCEDURE — 99291 CRITICAL CARE FIRST HOUR: CPT | Performed by: STUDENT IN AN ORGANIZED HEALTH CARE EDUCATION/TRAINING PROGRAM

## 2025-03-31 NOTE — ED TRIAGE NOTES
69 y/o male presents with facial swelling that has been intermittent for sometime. No airway compromise at time of triage. Patient able to speak in complete sentences, no swallowing difficulties

## 2025-03-31 NOTE — ED PROCEDURE NOTE
Procedure  Critical Care    Performed by: Adam Adams DO  Authorized by: Adam Adams DO    Critical care provider statement:     Critical care time (minutes):  31    Critical care time was exclusive of:  Separately billable procedures and treating other patients and teaching time    Critical care was necessary to treat or prevent imminent or life-threatening deterioration of the following conditions: Allergic reaction.    Critical care was time spent personally by me on the following activities:  Ordering and performing treatments and interventions, pulse oximetry, re-evaluation of patient's condition and evaluation of patient's response to treatment               Adam Adams DO  03/31/25 0113

## 2025-03-31 NOTE — DISCHARGE INSTRUCTIONS
You are diagnosed with an allergic reaction unclear etiology at this time.  I do recommend following up with your primary physician closely.  You have been prescribed an epinephrine pen to utilize for severe swelling if you do use this call 911 or return to the nearest emergency department immediately.  Otherwise take the Benadryl Pepcid steroids daily until completion.

## 2025-04-03 LAB
ATRIAL RATE: 60 BPM
P AXIS: 1 DEGREES
PR INTERVAL: 168 MS
Q ONSET: 249 MS
QRS COUNT: 10 BEATS
QRS DURATION: 115 MS
QT INTERVAL: 426 MS
QTC CALCULATION(BAZETT): 429 MS
QTC FREDERICIA: 428 MS
R AXIS: -46 DEGREES
T AXIS: -43 DEGREES
T OFFSET: 462 MS
VENTRICULAR RATE: 61 BPM

## 2025-04-04 LAB
ATRIAL RATE: 49 BPM
P AXIS: 40 DEGREES
P OFFSET: 194 MS
P ONSET: 148 MS
PR INTERVAL: 148 MS
Q ONSET: 222 MS
QRS COUNT: 8 BEATS
QRS DURATION: 106 MS
QT INTERVAL: 466 MS
QTC CALCULATION(BAZETT): 420 MS
QTC FREDERICIA: 435 MS
R AXIS: -35 DEGREES
T AXIS: -42 DEGREES
T OFFSET: 455 MS
VENTRICULAR RATE: 49 BPM

## 2025-04-14 ASSESSMENT — ENCOUNTER SYMPTOMS
NEAR-SYNCOPE: 0
CONSTITUTIONAL NEGATIVE: 1
MUSCULOSKELETAL NEGATIVE: 1
NEUROLOGICAL NEGATIVE: 1
EYES NEGATIVE: 1
PSYCHIATRIC NEGATIVE: 1
DYSPNEA ON EXERTION: 0
ORTHOPNEA: 0
SYNCOPE: 0
GASTROINTESTINAL NEGATIVE: 1
RESPIRATORY NEGATIVE: 1
ENDOCRINE NEGATIVE: 1
HEMATOLOGIC/LYMPHATIC NEGATIVE: 1
ALLERGIC/IMMUNOLOGIC NEGATIVE: 1
PND: 0
PALPITATIONS: 0

## 2025-05-14 ENCOUNTER — APPOINTMENT (OUTPATIENT)
Dept: CARDIOLOGY | Facility: CLINIC | Age: 70
End: 2025-05-14
Payer: MEDICARE

## 2025-05-23 RX ORDER — EPINEPHRINE 0.15 MG/.3ML
INJECTION INTRAMUSCULAR
COMMUNITY
Start: 2025-05-23

## 2025-05-29 ENCOUNTER — APPOINTMENT (OUTPATIENT)
Dept: ALLERGY | Facility: CLINIC | Age: 70
End: 2025-05-29
Payer: MEDICARE

## 2025-05-29 VITALS
OXYGEN SATURATION: 95 % | DIASTOLIC BLOOD PRESSURE: 72 MMHG | TEMPERATURE: 98.1 F | RESPIRATION RATE: 17 BRPM | HEART RATE: 60 BPM | SYSTOLIC BLOOD PRESSURE: 122 MMHG | HEIGHT: 68 IN | BODY MASS INDEX: 30.46 KG/M2 | WEIGHT: 201 LBS

## 2025-05-29 DIAGNOSIS — R22.1 SWELLING OF LIP, TONGUE, AND THROAT: ICD-10-CM

## 2025-05-29 DIAGNOSIS — E43 UNSPECIFIED SEVERE PROTEIN-CALORIE MALNUTRITION (MULTI): ICD-10-CM

## 2025-05-29 DIAGNOSIS — L50.9 URTICARIA: ICD-10-CM

## 2025-05-29 DIAGNOSIS — J30.89 ALLERGIC RHINITIS DUE TO DUST MITE: ICD-10-CM

## 2025-05-29 DIAGNOSIS — R22.0 SWELLING OF LIP, TONGUE, AND THROAT: ICD-10-CM

## 2025-05-29 DIAGNOSIS — T78.3XXD ANGIOEDEMA, SUBSEQUENT ENCOUNTER: ICD-10-CM

## 2025-05-29 DIAGNOSIS — J30.1 SEASONAL ALLERGIC RHINITIS DUE TO POLLEN: Primary | ICD-10-CM

## 2025-05-29 PROCEDURE — 95004 PERQ TESTS W/ALRGNC XTRCS: CPT | Performed by: ALLERGY & IMMUNOLOGY

## 2025-05-29 PROCEDURE — 99204 OFFICE O/P NEW MOD 45 MIN: CPT | Performed by: ALLERGY & IMMUNOLOGY

## 2025-05-29 RX ORDER — FEXOFENADINE HCL 180 MG/1
180 TABLET ORAL DAILY
Qty: 30 TABLET | Refills: 11 | Status: SHIPPED | OUTPATIENT
Start: 2025-05-29 | End: 2026-05-29

## 2025-05-29 NOTE — PATIENT INSTRUCTIONS
You have allergy to dust mite and weed pollen allergy.    For dust mite allergy, be sure to wash your bedding, including your sheets, weekly in hot water, in order to reduce dust mites.  Encase your pillow and mattress in a hypoallergenic or anti-dust mite case.      You may consider a HEPA filter if you do not have one, and if possible, put it in the patient's bedroom.      Vacuum regularly.    For pollen allergy, keep windows closed and use central air.  You can consider wearing a hat and sunglasses as well to reduce pollen exposure.  After being outside, wash hands and face or shower to reduce the pollen on your body.  Wash clothing after wearing outside during the pollen seasons.     The weed pollen season is late summer/early fall.      Please start a daily allergy pill. I have prescribed Allegra and I would like for you to take this each night at bed time.    Have lab work completed and follow up in 4-6 wks.  
7

## 2025-05-29 NOTE — PROGRESS NOTES
Patient ID: Satya Camarillo is a 70 y.o. male.     Chief Complaint: NPV referred by Dr. Lowry  Here with his wife  History Of Present Illness  Satya Camarillo is a 70 y.o. male with PMx angioedema presenting for consultation.     Patient started BP meds about 7 yrs ago  Swelling of the face for 2 yrs. Last episode was 2 wks ago.  Feels a scratching feeling, swelling occurs just after this.  Wife gives Benadryl and improves symptoms within 24 hours most times.  It does itch.  This has mainly been the face and occasionally the shoulder.  One time felt difficult to swallow, so went to the emergency room. This was March 2025  Denies lightheadedness, no n/v/d, no rashes appear, but does feel itch on the body.    Wife recalls that he had started to have red spots on his body just prior to these swelling episodes. He does not report significant itching with this rash.  He had taken Benadryl with this.    Has tried to evaluate environment and exposures and has not been able to identify a trigger.    Food Allergy  No    Eczema/ Atopic Dermatitis  No    Asthma  No    Rhinoconjunctivitis  No    Drug Allergy   Reviewed as catopril and plavix.    Insect Allergy   No    Infections  No history of frequent or recurrent infections      Review of Systems    Pertinent positives and negatives have been assessed in the HPI. All other systems have been reviewed and are negative except as noted in the HPI.    Allergies  Captopril and Plavix [clopidogrel]    Past Medical History  He has no past medical history on file.    Family History  Family History[1]    No history of food allergy or atopic disease in the family.    Surgical History  He has a past surgical history that includes Cardiac catheterization (N/A, 2/5/2024).    Social/Environmental History  He reports that he has never smoked. He has never used smokeless tobacco. He reports that he does not currently use alcohol. He reports that he does not use drugs.    Home: Lives  "in a house   Floors: Wood  Air Conditioning: Central  Smoker: No  Pets: none  Infestations: No  Molds: No  Occupation: Works as . Came from [facility] [employer] 30 yrs ago.    MEDICATIONS  Medications Ordered Prior to Encounter[2]      Physical Exam  Visit Vitals  /72   Pulse 60   Temp 36.7 °C (98.1 °F) (Temporal)   Resp 17   Ht 1.727 m (5' 8\")   Wt 91.2 kg (201 lb)   SpO2 95%   BMI 30.56 kg/m²   Smoking Status Never   BSA 2.09 m²       Wt Readings from Last 1 Encounters:   05/29/25 91.2 kg (201 lb)       Physical Exam    General: Well appearing, no acute distress  Head: Normocephalic, atraumatic, neck supple without lymphadenopathy  Eyes: EOMI, non-injected  Nose: No nasal crease, nares patent, slightly boggy turbinates, minimal discharge  Throat: Normal dentition, no erythema  Heart: Regular rate and rhythm  Lungs: Clear to auscultation bilaterally, effort normal  Abdomen: Soft, non-tender, normal bowel sounds, no hsm  Extremities: Moves all extremities symmetrically, no edema  Skin: No rashes/lesions  Psych: normal mood and affect    LAB RESULTS:  CBC:  Recent Labs     02/06/24  0552 02/05/24  0645 02/04/24  0807 02/03/24  1531   WBC 5.8 5.1 4.6 6.9   HGB 14.8 16.5 15.8 15.9   HCT 44.8 50.0 47.1 45.6    236 230 207   MCV 90 91 89 88   EOSABS  --   --   --  0.17       CMP:  Recent Labs     02/06/24  0552 02/05/24  0645 02/04/24  0807    137 136   K 4.3 4.6 4.2   * 107 106   CO2 23 23 22   ANIONGAP 11 12 12   BUN 17 19 15   CREATININE 1.02 1.06 0.92   EGFR 81 77 >90   MG 1.88 2.02 2.10     Recent Labs     02/03/24  1531   ALBUMIN 3.8   ALKPHOS 55   ALT 25   AST 14   BILITOT 0.6       Recent Labs     02/03/24  1531   EOSABS 0.17       HEME/ENDO:  Recent Labs     02/04/24  0807   HGBA1C 6.0*     Allergy skin tests reviewed and scanned-5/29/25    Assessment/Plan     Satya is a 71 yo man with a history of facial angioedema. This has occurred at least one time around the shoulder as well.  " He and his wife report that this is responsive to antihistamine, but one recent episode did require er visit and epinephrine due to concern for swelling in the airway.  Skin tests performed today do show dust mite and weed pollen.  He was also negative for shellfish allergy.  I have advised allergen avoidance measures, nightly Allegra and additional labs to rule out any additional triggers or underlying pathology that may have contributed to the severity of these swelling episodes. I have recommended follow up in 4-6 wks to review these labs.  Yuli Chavez DO          [1] No family history on file.  [2]   Current Outpatient Medications on File Prior to Visit   Medication Sig Dispense Refill    amLODIPine (Norvasc) 5 mg tablet Take 1 tablet (5 mg) by mouth once daily. 90 tablet 3    atorvastatin (Lipitor) 80 mg tablet Take 1 tablet (80 mg) by mouth once daily at bedtime. 90 tablet 3    carvedilol (Coreg) 12.5 mg tablet Take 2 tablets (25 mg) by mouth 2 times a day. 360 tablet 3    EPINEPHrine (Epipen) 0.3 mg/0.3 mL injection syringe Inject 0.3 mL (0.3 mg) into the muscle 1 time if needed for anaphylaxis for up to 1 dose. Inject into upper leg. Call 911 after use. 1 each 0    hydroCHLOROthiazide (HYDRODiuril) 25 mg tablet Take 1 tablet (25 mg) by mouth once daily. 30 tablet 11    rosuvastatin (Crestor) 40 mg tablet Take 1 tablet (40 mg) by mouth once daily.      ticagrelor (Brilinta) 90 mg tablet Take 1 tablet (90 mg) by mouth 2 times a day. 60 tablet 11    diphenhydrAMINE (Sominex) 25 mg tablet Take 1 tablet (25 mg) by mouth 2 times a day for 5 days. 10 tablet 0    EPINEPHrine (Epipen-JR) 0.15 mg/0.3 mL injection syringe Inject 0.3 mL (0.15 mg) as directed 1 time for 1 dose. Inject into upper leg. Call 911 after use. 0.3 mL 0    famotidine (Pepcid) 20 mg tablet Take 1 tablet (20 mg) by mouth 2 times a day for 5 days. 10 tablet 0     No current facility-administered medications on file prior to visit.

## 2025-06-01 LAB
25(OH)D3+25(OH)D2 SERPL-MCNC: 22 NG/ML (ref 30–100)
ALBUMIN SERPL-MCNC: 4.5 G/DL (ref 3.6–5.1)
ALP SERPL-CCNC: 47 U/L (ref 35–144)
ALT SERPL-CCNC: 22 U/L (ref 9–46)
ANION GAP SERPL CALCULATED.4IONS-SCNC: 13 MMOL/L (CALC) (ref 7–17)
AST SERPL-CCNC: 15 U/L (ref 10–35)
BASOPHILS # BLD AUTO: 48 CELLS/UL (ref 0–200)
BASOPHILS NFR BLD AUTO: 0.8 %
BILIRUB SERPL-MCNC: 0.5 MG/DL (ref 0.2–1.2)
BUN SERPL-MCNC: 21 MG/DL (ref 7–25)
C1INH FUNCTIONAL/C1INH TOTAL MFR SERPL: NORMAL %
C1INH SERPL-MCNC: 31 MG/DL (ref 21–39)
CALCIUM SERPL-MCNC: 9.4 MG/DL (ref 8.6–10.3)
CHLORIDE SERPL-SCNC: 105 MMOL/L (ref 98–110)
CO2 SERPL-SCNC: 23 MMOL/L (ref 20–32)
CREAT SERPL-MCNC: 1.09 MG/DL (ref 0.7–1.35)
EGFRCR SERPLBLD CKD-EPI 2021: 74 ML/MIN/1.73M2
EOSINOPHIL # BLD AUTO: 168 CELLS/UL (ref 15–500)
EOSINOPHIL NFR BLD AUTO: 2.8 %
ERYTHROCYTE [DISTWIDTH] IN BLOOD BY AUTOMATED COUNT: 12.8 % (ref 11–15)
FC EPSILON RI + RII AB SER-ACNC: NORMAL
GLUCOSE SERPL-MCNC: 161 MG/DL (ref 65–99)
HCT VFR BLD AUTO: 45.5 % (ref 38.5–50)
HGB BLD-MCNC: 14.9 G/DL (ref 13.2–17.1)
LYMPHOCYTES # BLD AUTO: 1482 CELLS/UL (ref 850–3900)
LYMPHOCYTES NFR BLD AUTO: 24.7 %
MCH RBC QN AUTO: 29.5 PG (ref 27–33)
MCHC RBC AUTO-ENTMCNC: 32.7 G/DL (ref 32–36)
MCV RBC AUTO: 90.1 FL (ref 80–100)
MONOCYTES # BLD AUTO: 360 CELLS/UL (ref 200–950)
MONOCYTES NFR BLD AUTO: 6 %
NEUTROPHILS # BLD AUTO: 3942 CELLS/UL (ref 1500–7800)
NEUTROPHILS NFR BLD AUTO: 65.7 %
PLATELET # BLD AUTO: 237 THOUSAND/UL (ref 140–400)
PMV BLD REES-ECKER: 10.3 FL (ref 7.5–12.5)
POTASSIUM SERPL-SCNC: 3.9 MMOL/L (ref 3.5–5.3)
PROT SERPL-MCNC: 7.3 G/DL (ref 6.1–8.1)
RBC # BLD AUTO: 5.05 MILLION/UL (ref 4.2–5.8)
SODIUM SERPL-SCNC: 141 MMOL/L (ref 135–146)
THYROGLOB AB SERPL-ACNC: <1 IU/ML
THYROPEROXIDASE AB SERPL-ACNC: 1 IU/ML
TRYPTASE SERPL-MCNC: 5.7 MCG/L
WBC # BLD AUTO: 6 THOUSAND/UL (ref 3.8–10.8)

## 2025-06-02 LAB
25(OH)D3+25(OH)D2 SERPL-MCNC: 22 NG/ML (ref 30–100)
ALBUMIN SERPL-MCNC: 4.5 G/DL (ref 3.6–5.1)
ALP SERPL-CCNC: 47 U/L (ref 35–144)
ALT SERPL-CCNC: 22 U/L (ref 9–46)
ANION GAP SERPL CALCULATED.4IONS-SCNC: 13 MMOL/L (CALC) (ref 7–17)
AST SERPL-CCNC: 15 U/L (ref 10–35)
BASOPHILS # BLD AUTO: 48 CELLS/UL (ref 0–200)
BASOPHILS NFR BLD AUTO: 0.8 %
BILIRUB SERPL-MCNC: 0.5 MG/DL (ref 0.2–1.2)
BUN SERPL-MCNC: 21 MG/DL (ref 7–25)
C1INH FUNCTIONAL/C1INH TOTAL MFR SERPL: 97 %
C1INH SERPL-MCNC: 31 MG/DL (ref 21–39)
CALCIUM SERPL-MCNC: 9.4 MG/DL (ref 8.6–10.3)
CHLORIDE SERPL-SCNC: 105 MMOL/L (ref 98–110)
CO2 SERPL-SCNC: 23 MMOL/L (ref 20–32)
CREAT SERPL-MCNC: 1.09 MG/DL (ref 0.7–1.35)
EGFRCR SERPLBLD CKD-EPI 2021: 74 ML/MIN/1.73M2
EOSINOPHIL # BLD AUTO: 168 CELLS/UL (ref 15–500)
EOSINOPHIL NFR BLD AUTO: 2.8 %
ERYTHROCYTE [DISTWIDTH] IN BLOOD BY AUTOMATED COUNT: 12.8 % (ref 11–15)
FC EPSILON RI + RII AB SER-ACNC: NORMAL
GLUCOSE SERPL-MCNC: 161 MG/DL (ref 65–99)
HCT VFR BLD AUTO: 45.5 % (ref 38.5–50)
HGB BLD-MCNC: 14.9 G/DL (ref 13.2–17.1)
LYMPHOCYTES # BLD AUTO: 1482 CELLS/UL (ref 850–3900)
LYMPHOCYTES NFR BLD AUTO: 24.7 %
MCH RBC QN AUTO: 29.5 PG (ref 27–33)
MCHC RBC AUTO-ENTMCNC: 32.7 G/DL (ref 32–36)
MCV RBC AUTO: 90.1 FL (ref 80–100)
MONOCYTES # BLD AUTO: 360 CELLS/UL (ref 200–950)
MONOCYTES NFR BLD AUTO: 6 %
NEUTROPHILS # BLD AUTO: 3942 CELLS/UL (ref 1500–7800)
NEUTROPHILS NFR BLD AUTO: 65.7 %
PLATELET # BLD AUTO: 237 THOUSAND/UL (ref 140–400)
PMV BLD REES-ECKER: 10.3 FL (ref 7.5–12.5)
POTASSIUM SERPL-SCNC: 3.9 MMOL/L (ref 3.5–5.3)
PROT SERPL-MCNC: 7.3 G/DL (ref 6.1–8.1)
RBC # BLD AUTO: 5.05 MILLION/UL (ref 4.2–5.8)
SODIUM SERPL-SCNC: 141 MMOL/L (ref 135–146)
THYROGLOB AB SERPL-ACNC: <1 IU/ML
THYROPEROXIDASE AB SERPL-ACNC: 1 IU/ML
TRYPTASE SERPL-MCNC: 5.7 MCG/L
WBC # BLD AUTO: 6 THOUSAND/UL (ref 3.8–10.8)

## 2025-06-11 LAB
25(OH)D3+25(OH)D2 SERPL-MCNC: 22 NG/ML (ref 30–100)
ALBUMIN SERPL-MCNC: 4.5 G/DL (ref 3.6–5.1)
ALP SERPL-CCNC: 47 U/L (ref 35–144)
ALT SERPL-CCNC: 22 U/L (ref 9–46)
ANION GAP SERPL CALCULATED.4IONS-SCNC: 13 MMOL/L (CALC) (ref 7–17)
AST SERPL-CCNC: 15 U/L (ref 10–35)
BASOPHILS # BLD AUTO: 48 CELLS/UL (ref 0–200)
BASOPHILS NFR BLD AUTO: 0.8 %
BILIRUB SERPL-MCNC: 0.5 MG/DL (ref 0.2–1.2)
BUN SERPL-MCNC: 21 MG/DL (ref 7–25)
C1INH FUNCTIONAL/C1INH TOTAL MFR SERPL: 97 %
C1INH SERPL-MCNC: 31 MG/DL (ref 21–39)
CALCIUM SERPL-MCNC: 9.4 MG/DL (ref 8.6–10.3)
CHLORIDE SERPL-SCNC: 105 MMOL/L (ref 98–110)
CO2 SERPL-SCNC: 23 MMOL/L (ref 20–32)
CREAT SERPL-MCNC: 1.09 MG/DL (ref 0.7–1.35)
EGFRCR SERPLBLD CKD-EPI 2021: 74 ML/MIN/1.73M2
EOSINOPHIL # BLD AUTO: 168 CELLS/UL (ref 15–500)
EOSINOPHIL NFR BLD AUTO: 2.8 %
ERYTHROCYTE [DISTWIDTH] IN BLOOD BY AUTOMATED COUNT: 12.8 % (ref 11–15)
FC EPSILON RI + RII AB SER-ACNC: <16 %
GLUCOSE SERPL-MCNC: 161 MG/DL (ref 65–99)
HCT VFR BLD AUTO: 45.5 % (ref 38.5–50)
HGB BLD-MCNC: 14.9 G/DL (ref 13.2–17.1)
LYMPHOCYTES # BLD AUTO: 1482 CELLS/UL (ref 850–3900)
LYMPHOCYTES NFR BLD AUTO: 24.7 %
MCH RBC QN AUTO: 29.5 PG (ref 27–33)
MCHC RBC AUTO-ENTMCNC: 32.7 G/DL (ref 32–36)
MCV RBC AUTO: 90.1 FL (ref 80–100)
MONOCYTES # BLD AUTO: 360 CELLS/UL (ref 200–950)
MONOCYTES NFR BLD AUTO: 6 %
NEUTROPHILS # BLD AUTO: 3942 CELLS/UL (ref 1500–7800)
NEUTROPHILS NFR BLD AUTO: 65.7 %
PLATELET # BLD AUTO: 237 THOUSAND/UL (ref 140–400)
PMV BLD REES-ECKER: 10.3 FL (ref 7.5–12.5)
POTASSIUM SERPL-SCNC: 3.9 MMOL/L (ref 3.5–5.3)
PROT SERPL-MCNC: 7.3 G/DL (ref 6.1–8.1)
RBC # BLD AUTO: 5.05 MILLION/UL (ref 4.2–5.8)
SODIUM SERPL-SCNC: 141 MMOL/L (ref 135–146)
THYROGLOB AB SERPL-ACNC: <1 IU/ML
THYROPEROXIDASE AB SERPL-ACNC: 1 IU/ML
TRYPTASE SERPL-MCNC: 5.7 MCG/L
WBC # BLD AUTO: 6 THOUSAND/UL (ref 3.8–10.8)

## 2025-07-09 ENCOUNTER — APPOINTMENT (OUTPATIENT)
Dept: ALLERGY | Facility: CLINIC | Age: 70
End: 2025-07-09
Payer: MEDICARE

## 2025-08-04 ENCOUNTER — APPOINTMENT (OUTPATIENT)
Dept: ALLERGY | Facility: CLINIC | Age: 70
End: 2025-08-04
Payer: MEDICARE

## 2025-08-04 VITALS
SYSTOLIC BLOOD PRESSURE: 140 MMHG | HEIGHT: 68 IN | TEMPERATURE: 98.1 F | WEIGHT: 221 LBS | BODY MASS INDEX: 33.49 KG/M2 | DIASTOLIC BLOOD PRESSURE: 78 MMHG | HEART RATE: 62 BPM | OXYGEN SATURATION: 96 %

## 2025-08-04 DIAGNOSIS — J30.1 SEASONAL ALLERGIC RHINITIS DUE TO POLLEN: ICD-10-CM

## 2025-08-04 DIAGNOSIS — J30.89 ALLERGIC RHINITIS DUE TO DUST MITE: ICD-10-CM

## 2025-08-04 DIAGNOSIS — E55.9 VITAMIN D DEFICIENCY: Primary | ICD-10-CM

## 2025-08-04 PROCEDURE — 99214 OFFICE O/P EST MOD 30 MIN: CPT | Performed by: ALLERGY & IMMUNOLOGY

## 2025-08-04 PROCEDURE — 1159F MED LIST DOCD IN RCRD: CPT | Performed by: ALLERGY & IMMUNOLOGY

## 2025-08-04 PROCEDURE — 3078F DIAST BP <80 MM HG: CPT | Performed by: ALLERGY & IMMUNOLOGY

## 2025-08-04 PROCEDURE — 3077F SYST BP >= 140 MM HG: CPT | Performed by: ALLERGY & IMMUNOLOGY

## 2025-08-04 PROCEDURE — 3008F BODY MASS INDEX DOCD: CPT | Performed by: ALLERGY & IMMUNOLOGY

## 2025-08-04 NOTE — PROGRESS NOTES
Patient ID: Satya Camarillo is a 70 y.o. male.     Chief Complaint: Follow up  Here with his wife  History Of Present Illness  Satya Camarillo is a 70 y.o. male with PMx angioedema presenting for follow up  History from initial consultation:  Patient started BP meds about 7 yrs ago  Swelling of the face for 2 yrs. Last episode was 2 wks ago.  Feels a scratching feeling, swelling occurs just after this.  Wife gives Benadryl and improves symptoms within 24 hours most times.  It does itch.  This has mainly been the face and occasionally the shoulder.  One time felt difficult to swallow, so went to the emergency room. This was March 2025  Denies lightheadedness, no n/v/d, no rashes appear, but does feel itch on the body.    Wife recalls that he had started to have red spots on his body just prior to these swelling episodes. He does not report significant itching with this rash.  He had taken Benadryl with this.    Has tried to evaluate environment and exposures and has not been able to identify a trigger.    As of August 4, 2025-improved symptoms. No further swelling. Allegra q a.m. with allergy avoidance measures in place.    Food Allergy  No    Eczema/ Atopic Dermatitis  No    Asthma  No    Rhinoconjunctivitis  No    Drug Allergy   Reviewed as catopril and plavix.    Insect Allergy   No    Infections  No history of frequent or recurrent infections      Review of Systems   All other systems reviewed and are negative.      Pertinent positives and negatives have been assessed in the HPI. All other systems have been reviewed and are negative except as noted in the HPI.    Allergies  Captopril and Plavix [clopidogrel]    Past Medical History  He has no past medical history on file.    Family History  Family History[1]        Surgical History  He has a past surgical history that includes Cardiac catheterization (N/A, 2/5/2024).    Social/Environmental History  He reports that he has never smoked. He has never used  "smokeless tobacco. He reports that he does not currently use alcohol. He reports that he does not use drugs.    Home: Lives in a house   Floors: Wood  Air Conditioning: Central  Smoker: No  Pets: none  Infestations: No  Molds: No  Occupation: Works as . Came from [employer] 30 yrs ago.    MEDICATIONS  Medications Ordered Prior to Encounter[2]      Physical Exam  Visit Vitals  /78   Pulse 62   Temp 36.7 °C (98.1 °F)   Ht 1.727 m (5' 8\")   Wt 100 kg (221 lb)   SpO2 96%   BMI 33.60 kg/m²   Smoking Status Never   BSA 2.19 m²       Wt Readings from Last 1 Encounters:   08/04/25 100 kg (221 lb)       Physical Exam    General: Well appearing, no acute distress  Head: Normocephalic, atraumatic, neck supple without lymphadenopathy  Eyes: EOMI, non-injected  Nose: No nasal crease, nares patent, slightly boggy turbinates, minimal discharge  Throat: Normal dentition, no erythema  Heart: Regular rate and rhythm  Lungs: Clear to auscultation bilaterally, effort normal  Abdomen: Soft, non-tender, normal bowel sounds, no hsm  Extremities: Moves all extremities symmetrically, no edema  Skin: No rashes/lesions  Psych: normal mood and affect    LAB RESULTS:  CBC:  Recent Labs     05/29/25  1104 02/06/24  0552 02/05/24  0645 02/04/24  0807 02/03/24  1531   WBC 6.0 5.8 5.1   < > 6.9   HGB 14.9 14.8 16.5   < > 15.9   HCT 45.5 44.8 50.0   < > 45.6    212 236   < > 207   MCV 90.1 90 91   < > 88   EOSABS 168  --   --   --  0.17    < > = values in this interval not displayed.       CMP:  Recent Labs     05/29/25  1104 02/06/24  0552 02/05/24  0645 02/04/24  0807    138 137 136   K 3.9 4.3 4.6 4.2    108* 107 106   CO2 23 23 23 22   ANIONGAP 13 11 12 12   BUN 21 17 19 15   CREATININE 1.09 1.02 1.06 0.92   EGFR 74 81 77 >90   MG  --  1.88 2.02 2.10     Recent Labs     05/29/25  1104 02/03/24  1531   ALBUMIN 4.5 3.8   ALKPHOS 47 55   ALT 22 25   AST 15 14   BILITOT 0.5 0.6       Recent Labs     " 05/29/25  1104 02/03/24  1531   EOSABS 168 0.17       HEME/ENDO:  Recent Labs     02/04/24  0807   HGBA1C 6.0*     Allergy skin tests reviewed and scanned-5/29/25    Assessment/Plan   Satya is a 69 yo man with a history of facial angioedema. This has occurred at least one time around the shoulder as well.  He and his wife report that this is responsive to antihistamine, but one recent episode did require er visit and epinephrine due to concern for swelling in the airway.  Skin tests performed in May did show dust mite and weed pollen allergy.  Has gotten air purifier and dust mite covers. He was also negative for shellfish allergy.  He has had no further swelling episodes.  -Continue taking Allegra each morning is recommended..  This seems to have helped the swelling. Continue to monitor. Recommended yearly follow up.  Yuli Chavez DO            [1] No family history on file.  [2]   Current Outpatient Medications on File Prior to Visit   Medication Sig Dispense Refill    amLODIPine (Norvasc) 5 mg tablet Take 1 tablet (5 mg) by mouth once daily. 90 tablet 3    atorvastatin (Lipitor) 80 mg tablet Take 1 tablet (80 mg) by mouth once daily at bedtime. 90 tablet 3    carvedilol (Coreg) 12.5 mg tablet Take 2 tablets (25 mg) by mouth 2 times a day. 360 tablet 3    EPINEPHrine (Epipen) 0.3 mg/0.3 mL injection syringe Inject 0.3 mL (0.3 mg) into the muscle 1 time if needed for anaphylaxis for up to 1 dose. Inject into upper leg. Call 911 after use. 1 each 0    hydroCHLOROthiazide (HYDRODiuril) 25 mg tablet Take 1 tablet (25 mg) by mouth once daily. 30 tablet 11    rosuvastatin (Crestor) 40 mg tablet Take 1 tablet (40 mg) by mouth once daily.      diphenhydrAMINE (Sominex) 25 mg tablet Take 1 tablet (25 mg) by mouth 2 times a day for 5 days. 10 tablet 0    EPINEPHrine (Epipen-JR) 0.15 mg/0.3 mL injection syringe Inject 0.3 mL (0.15 mg) as directed 1 time for 1 dose. Inject into upper leg. Call 911 after  use. 0.3 mL 0    famotidine (Pepcid) 20 mg tablet Take 1 tablet (20 mg) by mouth 2 times a day for 5 days. 10 tablet 0    fexofenadine (Allegra) 180 mg tablet Take 1 tablet (180 mg) by mouth once daily. (Patient not taking: Reported on 8/4/2025) 30 tablet 11    ticagrelor (Brilinta) 90 mg tablet Take 1 tablet (90 mg) by mouth 2 times a day. (Patient not taking: Reported on 8/4/2025) 60 tablet 11     No current facility-administered medications on file prior to visit.

## 2025-08-04 NOTE — PATIENT INSTRUCTIONS
Take a vitamin d supplement    Continue daily allergy pill and avoidance of dust mite and weed pollen.    Follow up yearly.

## 2026-08-03 ENCOUNTER — APPOINTMENT (OUTPATIENT)
Dept: ALLERGY | Facility: CLINIC | Age: 71
End: 2026-08-03
Payer: MEDICARE

## (undated) DEVICE — VALVE, HEMO, GUARDIAN II, W/GUIDEWIRE INSERTION TOOL & TORQUE

## (undated) DEVICE — CATHETER, OPTITORQUE, 6FR 110CM, TIGER RADIAL 4.0

## (undated) DEVICE — BAND, VASCULAR, RADIAL HEMOSTAT, REGULAR 24CM

## (undated) DEVICE — GUIDEWIRE, INQWIRE, 3MM J, .035 X 210CM, FIXED

## (undated) DEVICE — ELECTRODE, MULTIFUNCTION, QUICK-COMBO, EDGE SYSTEM, 2 FT

## (undated) DEVICE — CATHETER, BALLOON, EMERGE, PTCA, 15 X 2.0MM

## (undated) DEVICE — DEVICE KIT, INFLATION, CUSTOM, PARMA

## (undated) DEVICE — CATHETER, GUIDING LAUNCHER 6FR EBU 3.75 LEFT

## (undated) DEVICE — CATHETER, BALLOON, NC EUPHORA NONCOMPLIANT 2.5 X 12 X 142CM

## (undated) DEVICE — CATHETER, EXPO, MODEL-D, 6FR FR4

## (undated) DEVICE — GUIDEWIRE, SION BLUE PTCA, 0.041 X 190CM, STRT

## (undated) DEVICE — INTRODUCER, GLIDESHEATH SLENDER A-KIT, 6FR 10CM